# Patient Record
Sex: MALE | Race: WHITE | Employment: OTHER | URBAN - NONMETROPOLITAN AREA
[De-identification: names, ages, dates, MRNs, and addresses within clinical notes are randomized per-mention and may not be internally consistent; named-entity substitution may affect disease eponyms.]

---

## 2017-01-10 RX ORDER — ALPRAZOLAM 0.25 MG/1
0.25 TABLET ORAL 3 TIMES DAILY PRN
Qty: 90 TABLET | Refills: 0 | Status: SHIPPED | OUTPATIENT
Start: 2017-01-10 | End: 2017-02-10 | Stop reason: SDUPTHER

## 2017-01-10 RX ORDER — HYDROCODONE BITARTRATE AND ACETAMINOPHEN 10; 325 MG/1; MG/1
1 TABLET ORAL EVERY 6 HOURS PRN
Qty: 105 TABLET | Refills: 0 | Status: SHIPPED | OUTPATIENT
Start: 2017-01-10 | End: 2017-02-10 | Stop reason: SDUPTHER

## 2017-01-31 ENCOUNTER — OFFICE VISIT (OUTPATIENT)
Dept: PRIMARY CARE CLINIC | Age: 62
End: 2017-01-31
Payer: COMMERCIAL

## 2017-01-31 VITALS
RESPIRATION RATE: 20 BRPM | TEMPERATURE: 97.7 F | WEIGHT: 290 LBS | BODY MASS INDEX: 39.28 KG/M2 | HEART RATE: 88 BPM | DIASTOLIC BLOOD PRESSURE: 80 MMHG | SYSTOLIC BLOOD PRESSURE: 136 MMHG | HEIGHT: 72 IN

## 2017-01-31 DIAGNOSIS — Z51.81 ENCOUNTER FOR MONITORING TESTOSTERONE REPLACEMENT THERAPY: Primary | ICD-10-CM

## 2017-01-31 DIAGNOSIS — M25.561 CHRONIC PAIN OF RIGHT KNEE: ICD-10-CM

## 2017-01-31 DIAGNOSIS — Z23 NEED FOR ZOSTER VACCINATION: ICD-10-CM

## 2017-01-31 DIAGNOSIS — Z79.890 ENCOUNTER FOR MONITORING TESTOSTERONE REPLACEMENT THERAPY: Primary | ICD-10-CM

## 2017-01-31 DIAGNOSIS — M25.552 LEFT HIP PAIN: ICD-10-CM

## 2017-01-31 DIAGNOSIS — G89.29 CHRONIC PAIN OF RIGHT KNEE: ICD-10-CM

## 2017-01-31 DIAGNOSIS — E34.9 TESTOSTERONE DEFICIENCY: ICD-10-CM

## 2017-01-31 LAB
AMPHETAMINE SCREEN, URINE: ABNORMAL
BARBITURATE SCREEN, URINE: ABNORMAL
BENZODIAZEPINE SCREEN, URINE: ABNORMAL
COCAINE METABOLITE SCREEN URINE: ABNORMAL
MDMA URINE: ABNORMAL
METHADONE SCREEN, URINE: ABNORMAL
METHAMPHETAMINE, URINE: ABNORMAL
OPIATE SCREEN URINE: ABNORMAL
OXYCODONE SCREEN URINE: ABNORMAL
PHENCYCLIDINE SCREEN URINE: ABNORMAL
PROPOXYPHENE SCREEN, URINE: ABNORMAL
THC: ABNORMAL
TRICYCLIC ANTIDEPRESSANTS, UR: ABNORMAL

## 2017-01-31 PROCEDURE — 99214 OFFICE O/P EST MOD 30 MIN: CPT | Performed by: FAMILY MEDICINE

## 2017-01-31 PROCEDURE — 80305 DRUG TEST PRSMV DIR OPT OBS: CPT | Performed by: FAMILY MEDICINE

## 2017-01-31 RX ORDER — TESTOSTERONE 30 MG/1.5ML
SOLUTION TOPICAL
Qty: 90 G | Refills: 0 | Status: SHIPPED | OUTPATIENT
Start: 2017-01-31

## 2017-01-31 RX ORDER — UBIDECARENONE 75 MG
5000 CAPSULE ORAL DAILY
COMMUNITY

## 2017-01-31 ASSESSMENT — ENCOUNTER SYMPTOMS
VOMITING: 0
ABDOMINAL PAIN: 0
COLOR CHANGE: 0
NAUSEA: 0
TROUBLE SWALLOWING: 0
WHEEZING: 0
DIARRHEA: 0
COUGH: 0
SHORTNESS OF BREATH: 0
SORE THROAT: 0
RHINORRHEA: 0
EYE PAIN: 0
CHEST TIGHTNESS: 0
PHOTOPHOBIA: 0
BACK PAIN: 1
CONSTIPATION: 0

## 2017-02-03 ENCOUNTER — TELEPHONE (OUTPATIENT)
Dept: PRIMARY CARE CLINIC | Age: 62
End: 2017-02-03

## 2017-02-13 ENCOUNTER — TELEPHONE (OUTPATIENT)
Dept: PRIMARY CARE CLINIC | Age: 62
End: 2017-02-13

## 2017-02-13 RX ORDER — ALPRAZOLAM 0.25 MG/1
0.25 TABLET ORAL 3 TIMES DAILY PRN
Qty: 90 TABLET | Refills: 0 | Status: SHIPPED | OUTPATIENT
Start: 2017-02-13 | End: 2017-03-14 | Stop reason: SDUPTHER

## 2017-02-13 RX ORDER — HYDROCODONE BITARTRATE AND ACETAMINOPHEN 10; 325 MG/1; MG/1
1 TABLET ORAL EVERY 6 HOURS PRN
Qty: 90 TABLET | Refills: 0 | Status: SHIPPED | OUTPATIENT
Start: 2017-02-13 | End: 2017-03-14 | Stop reason: SDUPTHER

## 2017-03-10 ENCOUNTER — TELEPHONE (OUTPATIENT)
Dept: PRIMARY CARE CLINIC | Age: 62
End: 2017-03-10

## 2017-03-14 ENCOUNTER — HOSPITAL ENCOUNTER (OUTPATIENT)
Age: 62
Setting detail: SPECIMEN
Discharge: HOME | End: 2017-03-14
Payer: COMMERCIAL

## 2017-03-14 ENCOUNTER — OFFICE VISIT (OUTPATIENT)
Dept: PRIMARY CARE CLINIC | Age: 62
End: 2017-03-14
Payer: COMMERCIAL

## 2017-03-14 VITALS
BODY MASS INDEX: 36.98 KG/M2 | HEIGHT: 72 IN | WEIGHT: 273 LBS | DIASTOLIC BLOOD PRESSURE: 80 MMHG | HEART RATE: 86 BPM | SYSTOLIC BLOOD PRESSURE: 128 MMHG | RESPIRATION RATE: 16 BRPM | OXYGEN SATURATION: 99 %

## 2017-03-14 DIAGNOSIS — Z51.81 ENCOUNTER FOR MONITORING TESTOSTERONE REPLACEMENT THERAPY: Primary | ICD-10-CM

## 2017-03-14 DIAGNOSIS — L98.9 CHANGING SKIN LESION: ICD-10-CM

## 2017-03-14 DIAGNOSIS — Z79.890 ENCOUNTER FOR MONITORING TESTOSTERONE REPLACEMENT THERAPY: Primary | ICD-10-CM

## 2017-03-14 DIAGNOSIS — Z11.59 NEED FOR HEPATITIS C SCREENING TEST: ICD-10-CM

## 2017-03-14 PROCEDURE — 11100 PR BIOPSY OF SKIN LESION: CPT | Performed by: FAMILY MEDICINE

## 2017-03-14 PROCEDURE — 88305 TISSUE EXAM BY PATHOLOGIST: CPT

## 2017-03-14 PROCEDURE — 99213 OFFICE O/P EST LOW 20 MIN: CPT | Performed by: FAMILY MEDICINE

## 2017-03-14 RX ORDER — ALPRAZOLAM 0.25 MG/1
0.25 TABLET ORAL 3 TIMES DAILY PRN
Qty: 90 TABLET | Refills: 0 | Status: SHIPPED | OUTPATIENT
Start: 2017-03-14 | End: 2017-04-17 | Stop reason: SDUPTHER

## 2017-03-14 RX ORDER — HYDROCODONE BITARTRATE AND ACETAMINOPHEN 10; 325 MG/1; MG/1
1 TABLET ORAL EVERY 6 HOURS PRN
Qty: 90 TABLET | Refills: 0 | Status: SHIPPED | OUTPATIENT
Start: 2017-03-14 | End: 2017-04-17 | Stop reason: SDUPTHER

## 2017-03-14 ASSESSMENT — ENCOUNTER SYMPTOMS
SHORTNESS OF BREATH: 0
DIARRHEA: 0
WHEEZING: 0
COUGH: 0
NAUSEA: 0
VOMITING: 0
CONSTIPATION: 0
ABDOMINAL PAIN: 0
CHEST TIGHTNESS: 0

## 2017-03-17 ENCOUNTER — TELEPHONE (OUTPATIENT)
Dept: PRIMARY CARE CLINIC | Age: 62
End: 2017-03-17

## 2017-03-17 ENCOUNTER — HOSPITAL ENCOUNTER (OUTPATIENT)
Age: 62
Discharge: HOME | End: 2017-03-17
Payer: COMMERCIAL

## 2017-04-17 RX ORDER — ALPRAZOLAM 0.25 MG/1
0.25 TABLET ORAL 3 TIMES DAILY PRN
Qty: 90 TABLET | Refills: 0 | Status: SHIPPED | OUTPATIENT
Start: 2017-04-17 | End: 2017-05-22 | Stop reason: SDUPTHER

## 2017-04-17 RX ORDER — HYDROCODONE BITARTRATE AND ACETAMINOPHEN 10; 325 MG/1; MG/1
1 TABLET ORAL EVERY 6 HOURS PRN
Qty: 90 TABLET | Refills: 0 | Status: SHIPPED | OUTPATIENT
Start: 2017-04-17 | End: 2017-05-22 | Stop reason: SDUPTHER

## 2017-04-25 DIAGNOSIS — Z12.11 SCREENING FOR COLON CANCER: Primary | ICD-10-CM

## 2017-04-25 LAB
CONTROL: NORMAL
HEMOCCULT STL QL: NEGATIVE

## 2017-04-25 PROCEDURE — 82274 ASSAY TEST FOR BLOOD FECAL: CPT | Performed by: FAMILY MEDICINE

## 2017-05-22 RX ORDER — HYDROCODONE BITARTRATE AND ACETAMINOPHEN 10; 325 MG/1; MG/1
1 TABLET ORAL EVERY 6 HOURS PRN
Qty: 90 TABLET | Refills: 0 | Status: SHIPPED | OUTPATIENT
Start: 2017-05-22 | End: 2017-06-06 | Stop reason: SDUPTHER

## 2017-05-22 RX ORDER — ALPRAZOLAM 0.25 MG/1
0.25 TABLET ORAL 3 TIMES DAILY PRN
Qty: 90 TABLET | Refills: 0 | Status: SHIPPED | OUTPATIENT
Start: 2017-05-22 | End: 2017-06-06 | Stop reason: SDUPTHER

## 2017-06-06 ENCOUNTER — OFFICE VISIT (OUTPATIENT)
Dept: PRIMARY CARE CLINIC | Age: 62
End: 2017-06-06
Payer: COMMERCIAL

## 2017-06-06 VITALS
RESPIRATION RATE: 18 BRPM | BODY MASS INDEX: 37.36 KG/M2 | SYSTOLIC BLOOD PRESSURE: 128 MMHG | TEMPERATURE: 98.4 F | DIASTOLIC BLOOD PRESSURE: 80 MMHG | HEART RATE: 81 BPM | WEIGHT: 275.8 LBS | HEIGHT: 72 IN | OXYGEN SATURATION: 97 %

## 2017-06-06 DIAGNOSIS — M17.0 PRIMARY OSTEOARTHRITIS OF BOTH KNEES: ICD-10-CM

## 2017-06-06 DIAGNOSIS — E34.9 TESTOSTERONE DEFICIENCY: ICD-10-CM

## 2017-06-06 DIAGNOSIS — G56.03 BILATERAL CARPAL TUNNEL SYNDROME: ICD-10-CM

## 2017-06-06 DIAGNOSIS — Z12.11 COLON CANCER SCREENING: ICD-10-CM

## 2017-06-06 DIAGNOSIS — G89.29 CHRONIC PAIN OF RIGHT KNEE: ICD-10-CM

## 2017-06-06 DIAGNOSIS — M25.552 LEFT HIP PAIN: ICD-10-CM

## 2017-06-06 DIAGNOSIS — M25.561 CHRONIC PAIN OF RIGHT KNEE: ICD-10-CM

## 2017-06-06 DIAGNOSIS — R19.5 POSITIVE FECAL IMMUNOCHEMICAL TEST: Primary | ICD-10-CM

## 2017-06-06 LAB
AMPHETAMINE SCREEN, URINE: ABNORMAL
BARBITURATE SCREEN, URINE: ABNORMAL
BENZODIAZEPINE SCREEN, URINE: ABNORMAL
COCAINE METABOLITE SCREEN URINE: ABNORMAL
CONTROL: ABNORMAL
HEMOCCULT STL QL: POSITIVE
MDMA URINE: ABNORMAL
METHADONE SCREEN, URINE: ABNORMAL
METHAMPHETAMINE, URINE: ABNORMAL
OPIATE SCREEN URINE: ABNORMAL
OXYCODONE SCREEN URINE: ABNORMAL
PHENCYCLIDINE SCREEN URINE: ABNORMAL
PROPOXYPHENE SCREEN, URINE: ABNORMAL
THC: ABNORMAL
TRICYCLIC ANTIDEPRESSANTS, UR: ABNORMAL

## 2017-06-06 PROCEDURE — 99214 OFFICE O/P EST MOD 30 MIN: CPT | Performed by: FAMILY MEDICINE

## 2017-06-06 PROCEDURE — 80305 DRUG TEST PRSMV DIR OPT OBS: CPT | Performed by: FAMILY MEDICINE

## 2017-06-06 PROCEDURE — 82274 ASSAY TEST FOR BLOOD FECAL: CPT | Performed by: FAMILY MEDICINE

## 2017-06-06 RX ORDER — HYDROCODONE BITARTRATE AND ACETAMINOPHEN 10; 325 MG/1; MG/1
1 TABLET ORAL EVERY 6 HOURS PRN
Qty: 90 TABLET | Refills: 0 | Status: SHIPPED | OUTPATIENT
Start: 2017-07-20 | End: 2017-08-19

## 2017-06-06 RX ORDER — HYDROCODONE BITARTRATE AND ACETAMINOPHEN 10; 325 MG/1; MG/1
1 TABLET ORAL EVERY 6 HOURS PRN
Qty: 90 TABLET | Refills: 0 | Status: SHIPPED | OUTPATIENT
Start: 2017-08-19 | End: 2017-09-18

## 2017-06-06 RX ORDER — ALPRAZOLAM 0.25 MG/1
0.25 TABLET ORAL 3 TIMES DAILY PRN
Qty: 90 TABLET | Refills: 2 | Status: SHIPPED | OUTPATIENT
Start: 2017-06-21 | End: 2017-07-21

## 2017-06-06 RX ORDER — HYDROCODONE BITARTRATE AND ACETAMINOPHEN 10; 325 MG/1; MG/1
1 TABLET ORAL EVERY 6 HOURS PRN
Qty: 90 TABLET | Refills: 0 | Status: SHIPPED | OUTPATIENT
Start: 2017-06-21

## 2017-06-06 ASSESSMENT — ENCOUNTER SYMPTOMS
RHINORRHEA: 0
CHEST TIGHTNESS: 0
COLOR CHANGE: 0
WHEEZING: 0
COUGH: 0
CONSTIPATION: 0
SORE THROAT: 0
NAUSEA: 0
ABDOMINAL PAIN: 0
EYE PAIN: 0
DIARRHEA: 0
SHORTNESS OF BREATH: 0
VOMITING: 0
PHOTOPHOBIA: 0
TROUBLE SWALLOWING: 0

## 2017-06-15 ENCOUNTER — OFFICE VISIT (OUTPATIENT)
Dept: GASTROENTEROLOGY | Age: 62
End: 2017-06-15
Payer: COMMERCIAL

## 2017-06-15 VITALS
BODY MASS INDEX: 36.92 KG/M2 | OXYGEN SATURATION: 97 % | HEART RATE: 78 BPM | SYSTOLIC BLOOD PRESSURE: 124 MMHG | HEIGHT: 72 IN | WEIGHT: 272.6 LBS | DIASTOLIC BLOOD PRESSURE: 80 MMHG

## 2017-06-15 DIAGNOSIS — R12 CHRONIC HEARTBURN: ICD-10-CM

## 2017-06-15 DIAGNOSIS — K59.03 DRUG-INDUCED CONSTIPATION: ICD-10-CM

## 2017-06-15 DIAGNOSIS — R19.5 OB + STOOL: Primary | ICD-10-CM

## 2017-06-15 DIAGNOSIS — K62.5 BRBPR (BRIGHT RED BLOOD PER RECTUM): ICD-10-CM

## 2017-06-15 DIAGNOSIS — Z83.71 FAMILY HISTORY OF POLYPS IN THE COLON: ICD-10-CM

## 2017-06-15 PROBLEM — Z83.719 FAMILY HISTORY OF POLYPS IN THE COLON: Status: ACTIVE | Noted: 2017-06-15

## 2017-06-15 PROCEDURE — 99214 OFFICE O/P EST MOD 30 MIN: CPT | Performed by: NURSE PRACTITIONER

## 2017-06-15 ASSESSMENT — ENCOUNTER SYMPTOMS
BACK PAIN: 0
VOMITING: 0
SHORTNESS OF BREATH: 0
ALLERGIC/IMMUNOLOGIC NEGATIVE: 1
BLOOD IN STOOL: 0
ANAL BLEEDING: 1
ABDOMINAL PAIN: 0
WHEEZING: 0
SORE THROAT: 0
RECTAL PAIN: 0
COUGH: 0
NAUSEA: 0
DIARRHEA: 0
ABDOMINAL DISTENTION: 0
EYE DISCHARGE: 0
CONSTIPATION: 1
TROUBLE SWALLOWING: 0
RHINORRHEA: 0

## 2017-06-23 ENCOUNTER — ANESTHESIA (OUTPATIENT)
Dept: ENDOSCOPY | Age: 62
End: 2017-06-23
Payer: COMMERCIAL

## 2017-06-23 ENCOUNTER — ANESTHESIA EVENT (OUTPATIENT)
Dept: ENDOSCOPY | Age: 62
End: 2017-06-23
Payer: COMMERCIAL

## 2017-06-23 ENCOUNTER — HOSPITAL ENCOUNTER (OUTPATIENT)
Age: 62
Setting detail: OUTPATIENT SURGERY
Discharge: HOME | End: 2017-06-23
Attending: INTERNAL MEDICINE | Admitting: INTERNAL MEDICINE
Payer: COMMERCIAL

## 2017-06-23 VITALS
DIASTOLIC BLOOD PRESSURE: 71 MMHG | OXYGEN SATURATION: 93 % | SYSTOLIC BLOOD PRESSURE: 118 MMHG | RESPIRATION RATE: 24 BRPM

## 2017-06-23 VITALS
OXYGEN SATURATION: 96 % | RESPIRATION RATE: 18 BRPM | HEART RATE: 80 BPM | WEIGHT: 268 LBS | DIASTOLIC BLOOD PRESSURE: 83 MMHG | BODY MASS INDEX: 36.3 KG/M2 | SYSTOLIC BLOOD PRESSURE: 125 MMHG | HEIGHT: 72 IN | TEMPERATURE: 98 F

## 2017-06-23 PROCEDURE — 45385 COLONOSCOPY W/LESION REMOVAL: CPT | Performed by: INTERNAL MEDICINE

## 2017-06-23 PROCEDURE — 3609010300 HC COLONOSCOPY W/BIOPSY SINGLE/MULTIPLE: Performed by: INTERNAL MEDICINE

## 2017-06-23 PROCEDURE — 3700000000 HC ANESTHESIA ATTENDED CARE: Performed by: INTERNAL MEDICINE

## 2017-06-23 PROCEDURE — 7100000011 HC PHASE II RECOVERY - ADDTL 15 MIN: Performed by: INTERNAL MEDICINE

## 2017-06-23 PROCEDURE — 45380 COLONOSCOPY AND BIOPSY: CPT | Performed by: INTERNAL MEDICINE

## 2017-06-23 PROCEDURE — 2580000003 HC RX 258: Performed by: INTERNAL MEDICINE

## 2017-06-23 PROCEDURE — C1773 RET DEV, INSERTABLE: HCPCS | Performed by: INTERNAL MEDICINE

## 2017-06-23 PROCEDURE — 43239 EGD BIOPSY SINGLE/MULTIPLE: CPT | Performed by: INTERNAL MEDICINE

## 2017-06-23 PROCEDURE — 3700000001 HC ADD 15 MINUTES (ANESTHESIA): Performed by: INTERNAL MEDICINE

## 2017-06-23 PROCEDURE — 7100000010 HC PHASE II RECOVERY - FIRST 15 MIN: Performed by: INTERNAL MEDICINE

## 2017-06-23 PROCEDURE — 88305 TISSUE EXAM BY PATHOLOGIST: CPT

## 2017-06-23 PROCEDURE — 2500000003 HC RX 250 WO HCPCS: Performed by: INTERNAL MEDICINE

## 2017-06-23 PROCEDURE — 2580000003 HC RX 258: Performed by: NURSE ANESTHETIST, CERTIFIED REGISTERED

## 2017-06-23 PROCEDURE — 6360000002 HC RX W HCPCS: Performed by: NURSE ANESTHETIST, CERTIFIED REGISTERED

## 2017-06-23 PROCEDURE — 3609012400 HC EGD TRANSORAL BIOPSY SINGLE/MULTIPLE: Performed by: INTERNAL MEDICINE

## 2017-06-23 PROCEDURE — 2500000003 HC RX 250 WO HCPCS: Performed by: NURSE ANESTHETIST, CERTIFIED REGISTERED

## 2017-06-23 RX ORDER — LIDOCAINE HYDROCHLORIDE 20 MG/ML
INJECTION, SOLUTION INFILTRATION; PERINEURAL PRN
Status: DISCONTINUED | OUTPATIENT
Start: 2017-06-23 | End: 2017-06-23 | Stop reason: SDUPTHER

## 2017-06-23 RX ORDER — SODIUM CHLORIDE, SODIUM LACTATE, POTASSIUM CHLORIDE, CALCIUM CHLORIDE 600; 310; 30; 20 MG/100ML; MG/100ML; MG/100ML; MG/100ML
INJECTION, SOLUTION INTRAVENOUS CONTINUOUS
Status: DISCONTINUED | OUTPATIENT
Start: 2017-06-23 | End: 2017-06-23 | Stop reason: HOSPADM

## 2017-06-23 RX ORDER — FENTANYL CITRATE 50 UG/ML
INJECTION, SOLUTION INTRAMUSCULAR; INTRAVENOUS PRN
Status: DISCONTINUED | OUTPATIENT
Start: 2017-06-23 | End: 2017-06-23 | Stop reason: SDUPTHER

## 2017-06-23 RX ORDER — ONDANSETRON 2 MG/ML
4 INJECTION INTRAMUSCULAR; INTRAVENOUS
Status: DISCONTINUED | OUTPATIENT
Start: 2017-06-23 | End: 2017-06-23 | Stop reason: HOSPADM

## 2017-06-23 RX ORDER — LIDOCAINE HYDROCHLORIDE 10 MG/ML
1 INJECTION, SOLUTION EPIDURAL; INFILTRATION; INTRACAUDAL; PERINEURAL ONCE
Status: COMPLETED | OUTPATIENT
Start: 2017-06-23 | End: 2017-06-23

## 2017-06-23 RX ORDER — MIDAZOLAM HYDROCHLORIDE 1 MG/ML
INJECTION INTRAMUSCULAR; INTRAVENOUS PRN
Status: DISCONTINUED | OUTPATIENT
Start: 2017-06-23 | End: 2017-06-23 | Stop reason: SDUPTHER

## 2017-06-23 RX ORDER — PROMETHAZINE HYDROCHLORIDE 25 MG/ML
6.25 INJECTION, SOLUTION INTRAMUSCULAR; INTRAVENOUS
Status: DISCONTINUED | OUTPATIENT
Start: 2017-06-23 | End: 2017-06-23 | Stop reason: HOSPADM

## 2017-06-23 RX ORDER — DIPHENHYDRAMINE HYDROCHLORIDE 50 MG/ML
12.5 INJECTION INTRAMUSCULAR; INTRAVENOUS
Status: DISCONTINUED | OUTPATIENT
Start: 2017-06-23 | End: 2017-06-23 | Stop reason: HOSPADM

## 2017-06-23 RX ORDER — SODIUM CHLORIDE, SODIUM LACTATE, POTASSIUM CHLORIDE, CALCIUM CHLORIDE 600; 310; 30; 20 MG/100ML; MG/100ML; MG/100ML; MG/100ML
INJECTION, SOLUTION INTRAVENOUS CONTINUOUS PRN
Status: DISCONTINUED | OUTPATIENT
Start: 2017-06-23 | End: 2017-06-23 | Stop reason: SDUPTHER

## 2017-06-23 RX ORDER — PROPOFOL 10 MG/ML
INJECTION, EMULSION INTRAVENOUS PRN
Status: DISCONTINUED | OUTPATIENT
Start: 2017-06-23 | End: 2017-06-23 | Stop reason: SDUPTHER

## 2017-06-23 RX ADMIN — LIDOCAINE HYDROCHLORIDE 1 ML: 10 INJECTION, SOLUTION EPIDURAL; INFILTRATION; INTRACAUDAL; PERINEURAL at 10:18

## 2017-06-23 RX ADMIN — FENTANYL CITRATE 50 MCG: 50 INJECTION INTRAMUSCULAR; INTRAVENOUS at 10:39

## 2017-06-23 RX ADMIN — FENTANYL CITRATE 50 MCG: 50 INJECTION INTRAMUSCULAR; INTRAVENOUS at 10:45

## 2017-06-23 RX ADMIN — SODIUM CHLORIDE, POTASSIUM CHLORIDE, SODIUM LACTATE AND CALCIUM CHLORIDE: 600; 310; 30; 20 INJECTION, SOLUTION INTRAVENOUS at 10:17

## 2017-06-23 RX ADMIN — PROPOFOL 700 MG: 10 INJECTION, EMULSION INTRAVENOUS at 10:39

## 2017-06-23 RX ADMIN — MIDAZOLAM HYDROCHLORIDE 2 MG: 1 INJECTION, SOLUTION INTRAMUSCULAR; INTRAVENOUS at 10:39

## 2017-06-23 RX ADMIN — SODIUM CHLORIDE, SODIUM LACTATE, POTASSIUM CHLORIDE, AND CALCIUM CHLORIDE: 600; 310; 30; 20 INJECTION, SOLUTION INTRAVENOUS at 10:34

## 2017-06-23 RX ADMIN — LIDOCAINE HYDROCHLORIDE 40 MG: 20 INJECTION, SOLUTION INFILTRATION; PERINEURAL at 10:39

## 2017-06-23 ASSESSMENT — PAIN - FUNCTIONAL ASSESSMENT: PAIN_FUNCTIONAL_ASSESSMENT: 0-10

## 2022-03-25 ENCOUNTER — OFFICE VISIT (OUTPATIENT)
Age: 67
End: 2022-03-25
Payer: MEDICARE

## 2022-03-25 ENCOUNTER — HOSPITAL ENCOUNTER (EMERGENCY)
Age: 67
Discharge: HOME | End: 2022-03-25
Payer: MEDICARE

## 2022-03-25 ENCOUNTER — APPOINTMENT (OUTPATIENT)
Dept: CT IMAGING | Age: 67
End: 2022-03-25
Payer: MEDICARE

## 2022-03-25 VITALS
RESPIRATION RATE: 17 BRPM | SYSTOLIC BLOOD PRESSURE: 131 MMHG | BODY MASS INDEX: 31.83 KG/M2 | DIASTOLIC BLOOD PRESSURE: 84 MMHG | TEMPERATURE: 97.8 F | HEIGHT: 72 IN | OXYGEN SATURATION: 96 % | HEART RATE: 92 BPM | WEIGHT: 235 LBS

## 2022-03-25 VITALS
SYSTOLIC BLOOD PRESSURE: 112 MMHG | OXYGEN SATURATION: 96 % | WEIGHT: 235.8 LBS | BODY MASS INDEX: 31.94 KG/M2 | HEIGHT: 72 IN | RESPIRATION RATE: 18 BRPM | DIASTOLIC BLOOD PRESSURE: 62 MMHG | TEMPERATURE: 98 F | HEART RATE: 98 BPM

## 2022-03-25 DIAGNOSIS — R63.4 WEIGHT LOSS, NON-INTENTIONAL: Primary | ICD-10-CM

## 2022-03-25 DIAGNOSIS — K57.32 DIVERTICULITIS OF COLON: ICD-10-CM

## 2022-03-25 DIAGNOSIS — M54.50 ACUTE MIDLINE LOW BACK PAIN WITHOUT SCIATICA: Primary | ICD-10-CM

## 2022-03-25 DIAGNOSIS — R19.7 DIARRHEA, UNSPECIFIED TYPE: ICD-10-CM

## 2022-03-25 DIAGNOSIS — R93.5 ABNORMAL CT OF THE ABDOMEN: ICD-10-CM

## 2022-03-25 LAB
ALBUMIN SERPL-MCNC: 4.1 G/DL (ref 3.5–5.2)
ALP BLD-CCNC: 66 U/L (ref 40–130)
ALT SERPL-CCNC: 13 U/L (ref 5–41)
ANION GAP SERPL CALCULATED.3IONS-SCNC: 13 MMOL/L (ref 7–19)
APPEARANCE FLUID: ABNORMAL
AST SERPL-CCNC: 12 U/L (ref 5–40)
BASOPHILS ABSOLUTE: 0.1 K/UL (ref 0–0.2)
BASOPHILS RELATIVE PERCENT: 0.6 % (ref 0–1)
BILIRUB SERPL-MCNC: 0.4 MG/DL (ref 0.2–1.2)
BILIRUBIN, POC: ABNORMAL
BLOOD URINE, POC: ABNORMAL
BUN BLDV-MCNC: 14 MG/DL (ref 8–23)
CALCIUM SERPL-MCNC: 9.6 MG/DL (ref 8.8–10.2)
CHLORIDE BLD-SCNC: 98 MMOL/L (ref 98–111)
CLARITY, POC: CLEAR
CO2: 26 MMOL/L (ref 22–29)
COLOR, POC: ABNORMAL
CREAT SERPL-MCNC: 0.9 MG/DL (ref 0.5–1.2)
EOSINOPHILS ABSOLUTE: 0.3 K/UL (ref 0–0.6)
EOSINOPHILS RELATIVE PERCENT: 2.5 % (ref 0–5)
GFR AFRICAN AMERICAN: >59
GFR NON-AFRICAN AMERICAN: >60
GLUCOSE BLD-MCNC: 106 MG/DL (ref 74–109)
GLUCOSE URINE, POC: ABNORMAL
HCT VFR BLD CALC: 51 % (ref 42–52)
HEMOCCULT STL QL: ABNORMAL
HEMOCCULT STL QL: POSITIVE
HEMOCCULT STL QL: POSITIVE
HEMOGLOBIN: 16.7 G/DL (ref 14–18)
IMMATURE GRANULOCYTES #: 0 K/UL
KETONES, POC: ABNORMAL
LEUKOCYTE EST, POC: ABNORMAL
LIPASE: 42 U/L (ref 13–60)
LYMPHOCYTES ABSOLUTE: 1.9 K/UL (ref 1.1–4.5)
LYMPHOCYTES RELATIVE PERCENT: 17.2 % (ref 20–40)
MCH RBC QN AUTO: 31.3 PG (ref 27–31)
MCHC RBC AUTO-ENTMCNC: 32.7 G/DL (ref 33–37)
MCV RBC AUTO: 95.5 FL (ref 80–94)
MONOCYTES ABSOLUTE: 1 K/UL (ref 0–0.9)
MONOCYTES RELATIVE PERCENT: 9.1 % (ref 0–10)
NEUTROPHILS ABSOLUTE: 7.8 K/UL (ref 1.5–7.5)
NEUTROPHILS RELATIVE PERCENT: 70.2 % (ref 50–65)
NITRITE, POC: ABNORMAL
PDW BLD-RTO: 13 % (ref 11.5–14.5)
PH, POC: 5.5
PLATELET # BLD: 230 K/UL (ref 130–400)
PMV BLD AUTO: 10.9 FL (ref 9.4–12.4)
POTASSIUM REFLEX MAGNESIUM: 4.1 MMOL/L (ref 3.5–5)
PROTEIN, POC: 100
RBC # BLD: 5.34 M/UL (ref 4.7–6.1)
SODIUM BLD-SCNC: 137 MMOL/L (ref 136–145)
SPECIFIC GRAVITY, POC: >=1.03
TOTAL PROTEIN: 6.5 G/DL (ref 6.6–8.7)
UROBILINOGEN, POC: 2
WBC # BLD: 11.1 K/UL (ref 4.8–10.8)

## 2022-03-25 PROCEDURE — 80053 COMPREHEN METABOLIC PANEL: CPT

## 2022-03-25 PROCEDURE — 85025 COMPLETE CBC W/AUTO DIFF WBC: CPT

## 2022-03-25 PROCEDURE — 99283 EMERGENCY DEPT VISIT LOW MDM: CPT

## 2022-03-25 PROCEDURE — 81002 URINALYSIS NONAUTO W/O SCOPE: CPT

## 2022-03-25 PROCEDURE — 6360000002 HC RX W HCPCS: Performed by: PHYSICIAN ASSISTANT

## 2022-03-25 PROCEDURE — 96374 THER/PROPH/DIAG INJ IV PUSH: CPT

## 2022-03-25 PROCEDURE — 3017F COLORECTAL CA SCREEN DOC REV: CPT

## 2022-03-25 PROCEDURE — 4004F PT TOBACCO SCREEN RCVD TLK: CPT

## 2022-03-25 PROCEDURE — 82270 OCCULT BLOOD FECES: CPT

## 2022-03-25 PROCEDURE — 99203 OFFICE O/P NEW LOW 30 MIN: CPT

## 2022-03-25 PROCEDURE — 83690 ASSAY OF LIPASE: CPT

## 2022-03-25 PROCEDURE — 2580000003 HC RX 258: Performed by: PHYSICIAN ASSISTANT

## 2022-03-25 PROCEDURE — 74177 CT ABD & PELVIS W/CONTRAST: CPT

## 2022-03-25 PROCEDURE — 1123F ACP DISCUSS/DSCN MKR DOCD: CPT

## 2022-03-25 PROCEDURE — G8427 DOCREV CUR MEDS BY ELIG CLIN: HCPCS

## 2022-03-25 PROCEDURE — G8484 FLU IMMUNIZE NO ADMIN: HCPCS

## 2022-03-25 PROCEDURE — 4040F PNEUMOC VAC/ADMIN/RCVD: CPT

## 2022-03-25 PROCEDURE — 6360000004 HC RX CONTRAST MEDICATION: Performed by: PHYSICIAN ASSISTANT

## 2022-03-25 PROCEDURE — 36415 COLL VENOUS BLD VENIPUNCTURE: CPT

## 2022-03-25 PROCEDURE — G8417 CALC BMI ABV UP PARAM F/U: HCPCS

## 2022-03-25 RX ORDER — AMOXICILLIN AND CLAVULANATE POTASSIUM 875; 125 MG/1; MG/1
1 TABLET, FILM COATED ORAL 2 TIMES DAILY
Qty: 20 TABLET | Refills: 0 | Status: SHIPPED | OUTPATIENT
Start: 2022-03-25 | End: 2022-04-04

## 2022-03-25 RX ORDER — METOCLOPRAMIDE 10 MG/1
10 TABLET ORAL 4 TIMES DAILY
Qty: 120 TABLET | Refills: 3 | Status: SHIPPED | OUTPATIENT
Start: 2022-03-25

## 2022-03-25 RX ORDER — 0.9 % SODIUM CHLORIDE 0.9 %
500 INTRAVENOUS SOLUTION INTRAVENOUS ONCE
Status: COMPLETED | OUTPATIENT
Start: 2022-03-25 | End: 2022-03-25

## 2022-03-25 RX ORDER — METOCLOPRAMIDE HYDROCHLORIDE 5 MG/ML
10 INJECTION INTRAMUSCULAR; INTRAVENOUS ONCE
Status: COMPLETED | OUTPATIENT
Start: 2022-03-25 | End: 2022-03-25

## 2022-03-25 RX ADMIN — IOPAMIDOL 90 ML: 755 INJECTION, SOLUTION INTRAVENOUS at 18:49

## 2022-03-25 RX ADMIN — SODIUM CHLORIDE 500 ML: 9 INJECTION, SOLUTION INTRAVENOUS at 18:01

## 2022-03-25 RX ADMIN — METOCLOPRAMIDE 10 MG: 5 INJECTION, SOLUTION INTRAMUSCULAR; INTRAVENOUS at 18:05

## 2022-03-25 ASSESSMENT — ENCOUNTER SYMPTOMS
DIARRHEA: 1
ABDOMINAL PAIN: 1
COLOR CHANGE: 0
BLOOD IN STOOL: 0
ABDOMINAL DISTENTION: 1
EYE DISCHARGE: 0
ABDOMINAL PAIN: 1
BLOOD IN STOOL: 1
EYE ITCHING: 0
BACK PAIN: 0
SHORTNESS OF BREATH: 0
COUGH: 0
PHOTOPHOBIA: 0
APNEA: 0

## 2022-03-25 ASSESSMENT — PAIN SCALES - GENERAL: PAINLEVEL_OUTOF10: 7

## 2022-03-25 NOTE — PROGRESS NOTES
Asthma Brother     Colon Polyps Brother     Colon Cancer Neg Hx     Liver Cancer Neg Hx     Liver Disease Neg Hx     Esophageal Cancer Neg Hx     Rectal Cancer Neg Hx     Stomach Cancer Neg Hx        Social History     Tobacco Use    Smoking status: Current Every Day Smoker     Packs/day: 0.50     Years: 30.00     Pack years: 15.00    Smokeless tobacco: Not on file   Substance Use Topics    Alcohol use: Yes     Comment: occasionally      Current Outpatient Medications   Medication Sig Dispense Refill    omeprazole (PRILOSEC) 20 MG capsule Take 20 mg by mouth daily       HYDROcodone-acetaminophen (NORCO)  MG per tablet Take 1 tablet by mouth every 6 hours as needed for Pain . Earliest Fill Date: 6/21/17 (Patient not taking: Reported on 3/25/2022) 90 tablet 0    aspirin 81 MG tablet Take 81 mg by mouth daily (Patient not taking: Reported on 3/25/2022)      vitamin D (CHOLECALCIFEROL) 1000 UNIT TABS tablet Take 1,000 Units by mouth daily (Patient not taking: Reported on 3/25/2022)      vitamin B-12 (CYANOCOBALAMIN) 100 MCG tablet Take 5,000 mcg by mouth daily (Patient not taking: Reported on 3/25/2022)      Testosterone 30 MG/ACT SOLN Apply 1 pump to each armpit daily. (Patient not taking: Reported on 3/25/2022) 90 g 0    montelukast (SINGULAIR) 10 MG tablet Take 1 tablet by mouth nightly (Patient not taking: Reported on 3/25/2022) 30 tablet 5    Multiple Vitamins-Minerals (THERAPEUTIC MULTIVITAMIN-MINERALS) tablet Take 1 tablet by mouth daily (Patient not taking: Reported on 3/25/2022)      fluticasone (FLONASE) 50 MCG/ACT nasal spray 2 sprays by Nasal route daily (Patient not taking: Reported on 3/25/2022) 1 Bottle 5     No current facility-administered medications for this visit.      Allergies   Allergen Reactions    Naproxen      GI Bleed    Sulfa Antibiotics     Celebrex [Celecoxib] Rash       Health Maintenance   Topic Date Due    Hepatitis C screen  Never done    COVID-19 Vaccine (1) Never done    Depression Screen  Never done    Shingles Vaccine (1 of 2) Never done    A1C test (Diabetic or Prediabetic)  04/15/2014    Pneumococcal 65+ years Vaccine (1 of 1 - PPSV23) Never done    AAA screen  Never done    Flu vaccine (1) 09/01/2021    Lipid screen  01/31/2022    DTaP/Tdap/Td vaccine (2 - Td or Tdap) 03/14/2022    Colorectal Cancer Screen  06/23/2027    Hepatitis A vaccine  Aged Out    Hepatitis B vaccine  Aged Out    Hib vaccine  Aged Out    Meningococcal (ACWY) vaccine  Aged Out       Subjective:     Review of Systems   Constitutional: Negative for fever. Gastrointestinal: Positive for abdominal distention, abdominal pain and diarrhea. Negative for blood in stool. Genitourinary: Negative for dysuria and urgency.       :Objective      Physical Exam  Constitutional:       General: He is not in acute distress. Appearance: Normal appearance. He is not toxic-appearing. HENT:      Head: Normocephalic and atraumatic. Right Ear: Tympanic membrane, ear canal and external ear normal.      Left Ear: Tympanic membrane, ear canal and external ear normal.      Nose: Nose normal.      Mouth/Throat:      Mouth: Mucous membranes are moist.   Eyes:      General:         Right eye: No discharge. Left eye: No discharge. Conjunctiva/sclera: Conjunctivae normal.   Cardiovascular:      Rate and Rhythm: Normal rate and regular rhythm. Pulmonary:      Effort: Pulmonary effort is normal. No respiratory distress. Abdominal:      General: Abdomen is flat. Bowel sounds are normal. There is distension. Palpations: Abdomen is soft. Tenderness: There is abdominal tenderness (reports pressure - grimaces) in the right upper quadrant, epigastric area and left upper quadrant. There is no right CVA tenderness or left CVA tenderness. Hernia: A hernia is present. Comments: C/O low abdominal pressure over bladder.   Rectal exam performed with chaperone present [facility], LPN). + OB stool. Musculoskeletal:         General: Normal range of motion. Cervical back: Normal range of motion. Lymphadenopathy:      Cervical: No cervical adenopathy. Skin:     General: Skin is warm and dry. Capillary Refill: Capillary refill takes less than 2 seconds. Findings: No rash. Neurological:      General: No focal deficit present. Mental Status: He is alert. Psychiatric:         Mood and Affect: Mood normal.       /62   Pulse 98   Temp 98 °F (36.7 °C) (Temporal)   Resp 18   Ht 6' (1.829 m)   Wt 235 lb 12.8 oz (107 kg)   SpO2 96%   BMI 31.98 kg/m²     :Assessment       Diagnosis Orders   1. Acute midline low back pain without sciatica  POCT Urinalysis no Micro   2. Diarrhea, unspecified type  POCT occult blood stool       :Plan    Pt initially refused to go to ER for evaluation. Had planned to attempt to get pt GI appt early next week, but it has been > 3 years and he would have to reestablish with GI and PCP before being evaluated. Abnormal POC urine and OB stool here in office. D/W patient that colon cancer is a potential differential diagnosis that cannot be excluded here in the urgent care. He is agreeable to go to [facility] ER. Report called to [facility].      Orders Placed This Encounter   Procedures    POCT Urinalysis no Micro    POCT occult blood stool     Results for orders placed or performed in visit on 03/25/22   POCT Urinalysis no Micro   Result Value Ref Range    Color, UA dark yellow     Clarity, UA clear     Glucose, UA POC 100mg (A)     Bilirubin, UA moderate (A)     Ketones, UA trace (A)     Spec Grav, UA >=1.030     Blood, UA POC trace-lysed (A)     pH, UA 5.5     Protein, UA  (A)     Urobilinogen, UA 2.0 (A)     Leukocytes, UA neg     Nitrite, UA neg     Appearance, Fluid     POCT occult blood stool   Result Value Ref Range    OCCULT BLOOD FECAL positive     OCCULT BLOOD FECAL positive     OCCULT BLOOD FECAL       Return if symptoms worsen or fail to improve. No orders of the defined types were placed in this encounter. Patient given educational materials- see patient instructions. Discussed use, benefit, and side effects of prescribed medications. All patient questions answered. Pt voiced understanding. There are no Patient Instructions on file for this visit.       Electronically signed by LISA Tolentino CNP on 3/25/2022 at 3:41 PM

## 2022-03-25 NOTE — ED PROVIDER NOTES
[facility]  eMERGENCYdEPARTMENT eNCOUnter      Pt Name: Magalie Gross  MRN: [id number]  [place] 1955  Date of evaluation: 3/25/2022  Provider:KASEY Saldaña    CHIEF COMPLAINT       Chief Complaint   Patient presents with    Diarrhea     low abdominal pain since last night, diarrhea x 2.5 wks         HISTORY OF PRESENT ILLNESS  (Location/Symptom, Timing/Onset, Context/Setting, Quality, Duration, Modifying Factors, Severity.)   Magalie Gross is a 77 y.o. male who presents to the emergency department with history of lower abdominal pain acute onset starting last night less than 24-hour onset however has been having diarrhea for 2-1/2 weeks. Patient report given to me from nurse stating prior abnormal colonoscopy concern for possible colorectal CA that was not followed on. He denies any recent antibiotics in regards to C. difficile concern. 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 He has a appropriate appetite good intake no  symptoms. Patient states that it is more about his bladder and bilateral lower quadrants where he is mildly tender he states that he has had loose stool now for 2-1/2 weeks its been dark he endorses taking Pepto-Bismol could be playing a role. The pain is the acute issue that made him go to urgent care today. He rates it as a 7 out of 10 aching in character no intervention. Has already had urinalysis and rectal exam done in urgent care that was heme positive he had some ketones and glucose in his urine as well as bilirubin. He denies prior history of cirrhosis that he is aware of.   He has seen Dr. Silvana Adam as a PCP in the past but does not endorsing regularly. HPI    Nursing Notes were reviewed and I agree. REVIEW OF SYSTEMS    (2-9 systems for level 4, 10 or more for level 5)     Review of Systems   Constitutional: Positive for unexpected weight change. Negative for activity change, appetite change, chills and fever. HENT: Negative for congestion and dental problem. Eyes: Negative for photophobia, discharge and itching. Respiratory: Negative for apnea, cough and shortness of breath. Cardiovascular: Negative for chest pain. Gastrointestinal: Positive for abdominal pain and blood in stool. Musculoskeletal: Negative for arthralgias, back pain, gait problem, myalgias and neck pain. Skin: Negative for color change, pallor and rash. Neurological: Negative for dizziness, seizures and syncope. Psychiatric/Behavioral: Negative for agitation. The patient is not nervous/anxious. Except as noted above the remainder of the review of systems was reviewed and negative.        PAST MEDICAL HISTORY     Past Medical History:   Diagnosis Date    Arthritis     Chronic pain disorder     HIPS AND KNEES    GERD (gastroesophageal reflux disease)     Hyperlipidemia     Hypogonadism in male     Joint pain     Tobacco abuse     Vitamin D deficiency          SURGICAL HISTORY       Past Surgical History:   Procedure Laterality Date    CARPAL TUNNEL RELEASE Left 2/3/2016    LT CTR performed by Jennifer Khan MD at [address] [facility] ARTHROSCOPY      L KNEE SCOPE AND RIGHT KNEE SCOPE    MD COLONOSCOPY W/BIOPSY SINGLE/MULTIPLE N/A 6/23/2017    Dr CHRISTIANO Freeman-piecemeal, diverticular disease-Tubular AP (-) dysplasia x 3, serrated HP x 1--6 month recall    MD EGD TRANSORAL BIOPSY SINGLE/MULTIPLE N/A 6/23/2017    Dr Bar Mcconnell hiatal hernia, Gastric intestinal metaplasia, (-) dysplasia    PROSTATE SURGERY      biopsy    SHOULDER SURGERY Right     TESTICLE SURGERY      strangulated testicle    TONSILLECTOMY AND ADENOIDECTOMY           CURRENT MEDICATIONS       Discharge Medication List as of 3/25/2022  8:14 PM      CONTINUE these medications which have NOT CHANGED    Details   HYDROcodone-acetaminophen (NORCO)  MG per tablet Take 1 tablet by mouth every 6 hours as needed for Pain . Earliest Fill Date: 6/21/17, Disp-90 tablet, R-0Print      aspirin 81 MG tablet Take 81 mg by mouth daily Historical Med      vitamin D (CHOLECALCIFEROL) 1000 UNIT TABS tablet Take 1,000 Units by mouth daily Historical Med      vitamin B-12 (CYANOCOBALAMIN) 100 MCG tablet Take 5,000 mcg by mouth daily Historical Med      Testosterone 30 MG/ACT SOLN Apply 1 pump to each armpit daily. , Disp-90 g, R-0      montelukast (SINGULAIR) 10 MG tablet Take 1 tablet by mouth nightly, Disp-30 tablet, R-5      Multiple Vitamins-Minerals (THERAPEUTIC MULTIVITAMIN-MINERALS) tablet Take 1 tablet by mouth daily Historical Med      omeprazole (PRILOSEC) 20 MG capsule Take 20 mg by mouth daily Historical Med      fluticasone (FLONASE) 50 MCG/ACT nasal spray 2 sprays by Nasal route daily, Disp-1 Bottle, R-5             ALLERGIES     Naproxen, Sulfa antibiotics, and Celebrex [celecoxib]    FAMILY HISTORY       Family History   Problem Relation Age of Onset    Cancer Mother         breast cancer    Heart Disease Mother         afib    Hypertension Mother     Heart Disease Father         cad & afib    Asthma Brother     Colon Polyps Brother     Colon Cancer Neg Hx     Liver Cancer Neg Hx     Liver Disease Neg Hx     Esophageal Cancer Neg Hx     Rectal Cancer Neg Hx     Stomach Cancer Neg Hx           SOCIAL HISTORY       Social History     Socioeconomic History    Marital status:      Spouse name: None    Number of children: None    Years of education: None    Highest education level: None   Occupational History    None   Tobacco Use    Smoking status: Current Every Day Smoker     Packs/day: 1.00     Years: 30.00     Pack years: 30.00    Smokeless tobacco: Never Used Vaping Use    Vaping Use: Never used   Substance and Sexual Activity    Alcohol use: Yes     Comment: occasionally    Drug use: No    Sexual activity: None   Other Topics Concern    None   Social History Narrative    None     Social Determinants of Health     Financial Resource Strain:     Difficulty of Paying Living Expenses: Not on file   Food Insecurity:     Worried About Running Out of Food in the Last Year: Not on file    Scottie of Food in the Last Year: Not on file   Transportation Needs:     Lack of Transportation (Medical): Not on file    Lack of Transportation (Non-Medical):  Not on file   Physical Activity:     Days of Exercise per Week: Not on file    Minutes of Exercise per Session: Not on file   Stress:     Feeling of Stress : Not on file   Social Connections:     Frequency of Communication with Friends and Family: Not on file    Frequency of Social Gatherings with Friends and Family: Not on file    Attends Roman [employer] Services: Not on file    Active Member of [address] [place] [address] or Organizations: Not on file    Attends Club or Organization Meetings: Not on file    Marital Status: Not on file   Intimate Partner Violence:     Fear of Current or Ex-Partner: Not on file    Emotionally Abused: Not on file    Physically Abused: Not on file    Sexually Abused: Not on file   Housing Stability:     Unable to Pay for Housing in the Last Year: Not on file    Number of [place] in the Last Year: Not on file    Unstable Housing in the Last Year: Not on file       SCREENINGS    Greyson Coma Scale  Eye Opening: Spontaneous  Best Verbal Response: Oriented  Best Motor Response: Obeys commands  Greyson Coma Scale Score: 15      PHYSICAL EXAM    (up to 7 forlevel 4, 8 or more for level 5)     ED Triage Vitals [03/25/22 1626]   BP Temp Temp Source Pulse Resp SpO2 Height Weight   131/84 97.8 °F (36.6 °C) Temporal 92 17 96 % 6' (1.829 m) 235 lb (106.6 kg)       Physical Exam  Vitals and nursing note reviewed. Constitutional:       General: He is not in acute distress. Appearance: He is well-developed. He is obese. He is not ill-appearing or diaphoretic. HENT:      Head: Normocephalic and atraumatic. Right Ear: Tympanic membrane, ear canal and external ear normal.      Left Ear: Tympanic membrane, ear canal and external ear normal.      Nose: Nose normal.      Mouth/Throat:      Mouth: Mucous membranes are moist.   Eyes:      Pupils: Pupils are equal, round, and reactive to light. Neck:      Trachea: No tracheal deviation. Cardiovascular:      Rate and Rhythm: Normal rate and regular rhythm. Pulses: Normal pulses. Heart sounds: Normal heart sounds. No murmur heard. Pulmonary:      Effort: Pulmonary effort is normal.      Breath sounds: Normal breath sounds. No stridor. No wheezing. Chest:      Chest wall: No tenderness. Abdominal:      General: Abdomen is flat. Bowel sounds are normal. There is no distension. Palpations: Abdomen is soft. Tenderness: There is no abdominal tenderness. Musculoskeletal:         General: Normal range of motion. Cervical back: Normal range of motion and neck supple. Skin:     General: Skin is warm and dry. Capillary Refill: Capillary refill takes less than 2 seconds. Neurological:      Mental Status: He is alert and oriented to person, place, and time. Psychiatric:         Mood and Affect: Mood normal.         Behavior: Behavior normal.         Thought Content:  Thought content normal.         Judgment: Judgment normal.           DIAGNOSTIC RESULTS     RADIOLOGY:   Non-plain film images such as CT, Ultrasound and MRI are read by the radiologist. Plain radiographic images are visualized and preliminarilyinterpreted by No att. providers found with the below findings:      Interpretation per the Radiologist below, if available at the time of this note:    CT ABDOMEN PELVIS W IV CONTRAST Additional Contrast? None   Final Result   1. Apparent acute uncomplicated diverticulitis along the lower sigmoid   colon. No visualized perforation or abscess formation. I would not   exclude underlying sigmoid colon malignancy in this area, as the wall   is quite abnormal, given that there is a history of prior abnormal   colonoscopy; and I would recommend follow-up per the earlier   colonoscopy recommendations. Signed by Dr Cheli Gaming:  Labs Reviewed   CBC WITH AUTO DIFFERENTIAL - Abnormal; Notable for the following components:       Result Value    WBC 11.1 (*)     MCV 95.5 (*)     MCH 31.3 (*)     MCHC 32.7 (*)     Neutrophils % 70.2 (*)     Lymphocytes % 17.2 (*)     Neutrophils Absolute 7.8 (*)     Monocytes Absolute 1.00 (*)     All other components within normal limits   COMPREHENSIVE METABOLIC PANEL W/ REFLEX TO MG FOR LOW K - Abnormal; Notable for the following components: Total Protein 6.5 (*)     All other components within normal limits   GASTROINTESTINAL PANEL, MOLECULAR   LIPASE       All other labs were within normal range or notreturned as of this dictation. RE-ASSESSMENT        EMERGENCY DEPARTMENT COURSE and DIFFERENTIAL DIAGNOSIS/MDM:   Vitals:    Vitals:    03/25/22 1626   BP: 131/84   Pulse: 92   Resp: 17   Temp: 97.8 °F (36.6 °C)   TempSrc: Temporal   SpO2: 96%   Weight: 235 lb (106.6 kg)   Height: 6' (1.829 m)         MDM  Patient is made aware of this read from radiology in regards to potential for malignant concern about the rectum. He has diverticulitis which he states he has never been diagnosed with before his pain is mild feel he is appropriate for discharge oral treatment he is made aware to follow closely with Dr. Burgess Davila for outpatient referral to GI colonoscopy he verbalizes that he will do so based on CT read today. No other acute findings on this gentleman's work-up lab values are all unremarkable nothing atypical at this time.   He is passing p.o. challenge here for documentation  PROCEDURES:    Procedures      FINAL IMPRESSION      1. Weight loss, non-intentional    2. Diverticulitis of colon    3.  Abnormal CT of the abdomen          DISPOSITION/PLAN   DISPOSITION Decision To Discharge 03/25/2022 07:53:22 PM      PATIENT REFERRED TO:  [facility]  Farooq Valeriabuster  [phone]    If symptoms worsen    Elaina Brock MD  [address] [place] [address]  [address] [place] [address] [phone]      GI referral      DISCHARGE MEDICATIONS:  Discharge Medication List as of 3/25/2022  8:14 PM      START taking these medications    Details   amoxicillin-clavulanate (AUGMENTIN) 875-125 MG per tablet Take 1 tablet by mouth 2 times daily for 10 days, Disp-20 tablet, R-0Normal      metoclopramide (REGLAN) 10 MG tablet Take 1 tablet by mouth 4 times daily, Disp-120 tablet, R-3Normal             (Please note that portions of this note were completed with a voice recognition program.  Efforts were made to edit the dictations but occasionallywords are mis-transcribed.)    Ramona Morrison [address] [place] [address]  03/25/22 [id number]

## 2024-05-18 ENCOUNTER — APPOINTMENT (OUTPATIENT)
Dept: CT IMAGING | Age: 69
End: 2024-05-18
Payer: MEDICARE

## 2024-05-18 ENCOUNTER — APPOINTMENT (OUTPATIENT)
Dept: GENERAL RADIOLOGY | Age: 69
End: 2024-05-18
Payer: MEDICARE

## 2024-05-18 ENCOUNTER — HOSPITAL ENCOUNTER (EMERGENCY)
Age: 69
Discharge: HOME | End: 2024-05-18
Attending: EMERGENCY MEDICINE
Payer: MEDICARE

## 2024-05-18 VITALS
HEART RATE: 75 BPM | DIASTOLIC BLOOD PRESSURE: 79 MMHG | BODY MASS INDEX: 38.65 KG/M2 | OXYGEN SATURATION: 95 % | RESPIRATION RATE: 19 BRPM | SYSTOLIC BLOOD PRESSURE: 120 MMHG | WEIGHT: 285 LBS | TEMPERATURE: 98.2 F

## 2024-05-18 DIAGNOSIS — R06.02 SHORTNESS OF BREATH: Primary | ICD-10-CM

## 2024-05-18 LAB
ALBUMIN SERPL-MCNC: 4.5 G/DL (ref 3.5–5.2)
ALP SERPL-CCNC: 73 U/L (ref 40–130)
ALT SERPL-CCNC: 35 U/L (ref 5–41)
ANION GAP SERPL CALCULATED.3IONS-SCNC: 15 MMOL/L (ref 7–19)
AST SERPL-CCNC: 25 U/L (ref 5–40)
BASOPHILS # BLD: 0 K/UL (ref 0–0.2)
BASOPHILS NFR BLD: 0.5 % (ref 0–1)
BILIRUB SERPL-MCNC: 0.5 MG/DL (ref 0.2–1.2)
BNP BLD-MCNC: 64 PG/ML (ref 0–124)
BUN SERPL-MCNC: 17 MG/DL (ref 8–23)
CALCIUM SERPL-MCNC: 9.9 MG/DL (ref 8.8–10.2)
CHLORIDE SERPL-SCNC: 100 MMOL/L (ref 98–111)
CO2 SERPL-SCNC: 24 MMOL/L (ref 22–29)
CREAT SERPL-MCNC: 1.5 MG/DL (ref 0.5–1.2)
D DIMER PPP FEU-MCNC: 1.09 UG/ML FEU (ref 0–0.48)
EOSINOPHIL # BLD: 0.2 K/UL (ref 0–0.6)
EOSINOPHIL NFR BLD: 2.7 % (ref 0–5)
ERYTHROCYTE [DISTWIDTH] IN BLOOD BY AUTOMATED COUNT: 13 % (ref 11.5–14.5)
GLUCOSE SERPL-MCNC: 163 MG/DL (ref 74–109)
HCT VFR BLD AUTO: 52.3 % (ref 42–52)
HGB BLD-MCNC: 17.8 G/DL (ref 14–18)
IMM GRANULOCYTES # BLD: 0 K/UL
LYMPHOCYTES # BLD: 1.8 K/UL (ref 1.1–4.5)
LYMPHOCYTES NFR BLD: 23.7 % (ref 20–40)
MCH RBC QN AUTO: 30.7 PG (ref 27–31)
MCHC RBC AUTO-ENTMCNC: 34 G/DL (ref 33–37)
MCV RBC AUTO: 90.3 FL (ref 80–94)
MONOCYTES # BLD: 0.7 K/UL (ref 0–0.9)
MONOCYTES NFR BLD: 8.6 % (ref 0–10)
NEUTROPHILS # BLD: 4.9 K/UL (ref 1.5–7.5)
NEUTS SEG NFR BLD: 64.1 % (ref 50–65)
PLATELET # BLD AUTO: 250 K/UL (ref 130–400)
PMV BLD AUTO: 10.3 FL (ref 9.4–12.4)
POTASSIUM SERPL-SCNC: 4.1 MMOL/L (ref 3.5–5)
PROT SERPL-MCNC: 8 G/DL (ref 6.6–8.7)
RBC # BLD AUTO: 5.79 M/UL (ref 4.7–6.1)
SODIUM SERPL-SCNC: 139 MMOL/L (ref 136–145)
TROPONIN, HIGH SENSITIVITY: 11 NG/L (ref 0–22)
WBC # BLD AUTO: 7.7 K/UL (ref 4.8–10.8)

## 2024-05-18 PROCEDURE — 84484 ASSAY OF TROPONIN QUANT: CPT

## 2024-05-18 PROCEDURE — 71045 X-RAY EXAM CHEST 1 VIEW: CPT

## 2024-05-18 PROCEDURE — 85379 FIBRIN DEGRADATION QUANT: CPT

## 2024-05-18 PROCEDURE — 71275 CT ANGIOGRAPHY CHEST: CPT

## 2024-05-18 PROCEDURE — 85025 COMPLETE CBC W/AUTO DIFF WBC: CPT

## 2024-05-18 PROCEDURE — 36415 COLL VENOUS BLD VENIPUNCTURE: CPT

## 2024-05-18 PROCEDURE — 99285 EMERGENCY DEPT VISIT HI MDM: CPT

## 2024-05-18 PROCEDURE — 6360000004 HC RX CONTRAST MEDICATION: Performed by: EMERGENCY MEDICINE

## 2024-05-18 PROCEDURE — 93005 ELECTROCARDIOGRAM TRACING: CPT | Performed by: EMERGENCY MEDICINE

## 2024-05-18 PROCEDURE — 83880 ASSAY OF NATRIURETIC PEPTIDE: CPT

## 2024-05-18 PROCEDURE — 80053 COMPREHEN METABOLIC PANEL: CPT

## 2024-05-18 RX ORDER — METHYLPREDNISOLONE 4 MG/1
TABLET ORAL
Qty: 1 KIT | Refills: 0 | Status: SHIPPED | OUTPATIENT
Start: 2024-05-18 | End: 2024-05-24

## 2024-05-18 RX ADMIN — IOPAMIDOL 75 ML: 755 INJECTION, SOLUTION INTRAVENOUS at 11:27

## 2024-05-18 ASSESSMENT — ENCOUNTER SYMPTOMS
SHORTNESS OF BREATH: 0
RHINORRHEA: 0
SORE THROAT: 0
ABDOMINAL PAIN: 0
SINUS PRESSURE: 0
VOMITING: 0
NAUSEA: 0

## 2024-05-18 NOTE — DISCHARGE INSTRUCTIONS
The nature of the shortness of breath is unclear may be related to COPD but you also would benefit from further evaluation including a stress test done as an outpatient.  Return immediately to the emergency room for any new or worsening symptoms.

## 2024-05-18 NOTE — ED PROVIDER NOTES
[facility] EMERGENCY DEPT  eMERGENCY dEPARTMENT eNCOUnter      Pt Name: Francisco Avila  MRN: [id number]  Birthdate 1955  Date of evaluation: 5/18/2024  Provider: Clifton Alaniz MD    CHIEF COMPLAINT       Chief Complaint   Patient presents with    Shortness of Breath    Chest Pain         HISTORY OF PRESENT ILLNESS   (Location/Symptom, Timing/Onset,Context/Setting, Quality, Duration, Modifying Factors, Severity)  Note limiting factors.   Francisco Avila is a 68 y.o. 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  Has some mild chest discomfort with diaphoresis.  No nausea or vomiting; no cough; fever; chills; difficulty breathing at rest.  Took his pulse ox earlier after walking and it reported present.  Heart rate showed tachycardia.  Denies palpitations.  Works as a nurse.  Vaccinated against COVID and influenza.  Works as a nurse.  Notes feeling short of breath when he ambulates to the mailbox which is down a hill.  Distant history of a stress test.  Currently does not complain of chest pain.  Former smoker but quit 10 months ago; denies bright red blood per rectum or melena.  Had some diarrhea yesterday.    NursingNotes were reviewed.    REVIEW OF SYSTEMS    (2-9 systems for level 4, 10 or more for level 5)     Review of Systems   Constitutional:  Negative for chills, diaphoresis, fatigue and fever.   HENT:  Negative for rhinorrhea, sinus pressure and sore throat.    Eyes:  Negative for visual disturbance.   Respiratory:  Negative for shortness of breath.    Cardiovascular:  Negative for chest pain.   Gastrointestinal:  Negative for  be asymptomatic would recommend an outpatient stress test and further outpatient evaluation and the patient voiced understanding.  He is a nurse and sophisticated and is well aware of risks and benefits of further treatment and evaluation at this time.  Will be discharged home with a Medrol Dosepak as his symptoms may be related to COPD.      CONSULTS:  None    PROCEDURES:  Unless otherwise noted below, none     Procedures    FINAL IMPRESSION      1. Shortness of breath          DISPOSITION/PLAN   DISPOSITION Decision To Discharge 05/18/2024 01:17:26 PM      PATIENT REFERRED TO:  Francisco Sloan MD  [address] [place], [place] [id number] [phone]            DISCHARGE MEDICATIONS:  Discharge Medication List as of 5/18/2024  1:23 PM        START taking these medications    Details   methylPREDNISolone (MEDROL, CORY,) 4 MG tablet Take by mouth., Disp-1 kit, R-0Normal                (Please note that portions of this note were completed with a voice recognition program.  Efforts were made to edit thedictations but occasionally words are mis-transcribed.)    Clifton Alaniz MD (electronically signed)  Attending Emergency Physician        Clifton Alaniz MD  05/19/24 [id number]

## 2024-05-19 LAB
EKG P AXIS: 66 DEGREES
EKG P-R INTERVAL: 164 MS
EKG Q-T INTERVAL: 332 MS
EKG QRS DURATION: 100 MS
EKG QTC CALCULATION (BAZETT): 409 MS
EKG T AXIS: 68 DEGREES

## 2024-05-19 PROCEDURE — 93010 ELECTROCARDIOGRAM REPORT: CPT | Performed by: INTERNAL MEDICINE

## 2024-05-22 ENCOUNTER — TRANSCRIBE ORDERS (OUTPATIENT)
Dept: ADMINISTRATIVE | Facility: HOSPITAL | Age: 69
End: 2024-05-22

## 2024-05-22 DIAGNOSIS — R06.09 OTHER FORMS OF DYSPNEA: Primary | ICD-10-CM

## 2024-05-22 DIAGNOSIS — R07.9 CHEST PAIN, UNSPECIFIED TYPE: ICD-10-CM

## 2024-06-18 ENCOUNTER — HOSPITAL ENCOUNTER (OUTPATIENT)
Age: 69
Discharge: HOME | End: 2024-06-20
Payer: MEDICARE

## 2024-06-18 ENCOUNTER — HOSPITAL ENCOUNTER (OUTPATIENT)
Dept: NUCLEAR MEDICINE | Age: 69
Discharge: HOME | End: 2024-06-20
Payer: MEDICARE

## 2024-06-18 VITALS
DIASTOLIC BLOOD PRESSURE: 75 MMHG | WEIGHT: 275 LBS | BODY MASS INDEX: 37.25 KG/M2 | HEIGHT: 72 IN | SYSTOLIC BLOOD PRESSURE: 138 MMHG

## 2024-06-18 DIAGNOSIS — R06.09 DYSPNEA ON EXERTION: ICD-10-CM

## 2024-06-18 DIAGNOSIS — R07.9 CHEST PAIN, UNSPECIFIED TYPE: ICD-10-CM

## 2024-06-18 LAB
ECHO AO ASC DIAM: 3.4 CM
ECHO AO ASCENDING AORTA INDEX: 1.39 CM/M2
ECHO AO ROOT DIAM: 1.8 CM
ECHO AO ROOT INDEX: 0.74 CM/M2
ECHO AO SINUS VALSALVA DIAM: 3.2 CM
ECHO AO SINUS VALSALVA INDEX: 1.31 CM/M2
ECHO AO ST JNCT DIAM: 3.1 CM
ECHO AV AREA PEAK VELOCITY: 2.7 CM2
ECHO AV AREA VTI: 2.4 CM2
ECHO AV AREA/BSA PEAK VELOCITY: 1.1 CM2/M2
ECHO AV AREA/BSA VTI: 1 CM2/M2
ECHO AV MEAN GRADIENT: 3 MMHG
ECHO AV MEAN VELOCITY: 0.8 M/S
ECHO AV PEAK GRADIENT: 5 MMHG
ECHO AV PEAK VELOCITY: 1.1 M/S
ECHO AV VELOCITY RATIO: 0.82
ECHO AV VTI: 20.8 CM
ECHO BSA: 2.52 M2
ECHO EST RA PRESSURE: 3 MMHG
ECHO IVC PROX: 1.4 CM
ECHO LA AREA 2C: 14.3 CM2
ECHO LA AREA 4C: 9.9 CM2
ECHO LA DIAMETER INDEX: 1.15 CM/M2
ECHO LA DIAMETER: 2.8 CM
ECHO LA MAJOR AXIS: 4.5 CM
ECHO LA MINOR AXIS: 4.3 CM
ECHO LA TO AORTIC ROOT RATIO: 1.56
ECHO LA VOL BP: 27 ML (ref 18–58)
ECHO LA VOL MOD A2C: 40 ML (ref 18–58)
ECHO LA VOL MOD A4C: 18 ML (ref 18–58)
ECHO LA VOL/BSA BIPLANE: 11 ML/M2 (ref 16–34)
ECHO LA VOLUME INDEX MOD A2C: 16 ML/M2 (ref 16–34)
ECHO LA VOLUME INDEX MOD A4C: 7 ML/M2 (ref 16–34)
ECHO LV E' LATERAL VELOCITY: 6 CM/S
ECHO LV E' SEPTAL VELOCITY: 5 CM/S
ECHO LV EDV A2C: 118 ML
ECHO LV EDV A4C: 137 ML
ECHO LV EDV INDEX A4C: 56 ML/M2
ECHO LV EDV NDEX A2C: 48 ML/M2
ECHO LV EJECTION FRACTION A2C: 61 %
ECHO LV EJECTION FRACTION A4C: 64 %
ECHO LV EJECTION FRACTION BIPLANE: 60 % (ref 55–100)
ECHO LV ESV A2C: 46 ML
ECHO LV ESV A4C: 50 ML
ECHO LV ESV INDEX A2C: 19 ML/M2
ECHO LV ESV INDEX A4C: 20 ML/M2
ECHO LV FRACTIONAL SHORTENING: 31 % (ref 28–44)
ECHO LV INTERNAL DIMENSION DIASTOLE INDEX: 2.01 CM/M2
ECHO LV INTERNAL DIMENSION DIASTOLIC: 4.9 CM (ref 4.2–5.9)
ECHO LV INTERNAL DIMENSION SYSTOLIC INDEX: 1.39 CM/M2
ECHO LV INTERNAL DIMENSION SYSTOLIC: 3.4 CM
ECHO LV IVSD: 1.1 CM (ref 0.6–1)
ECHO LV MASS 2D: 226.4 G (ref 88–224)
ECHO LV MASS INDEX 2D: 92.8 G/M2 (ref 49–115)
ECHO LV POSTERIOR WALL DIASTOLIC: 1.3 CM (ref 0.6–1)
ECHO LV RELATIVE WALL THICKNESS RATIO: 0.53
ECHO LVOT AREA: 3.5 CM2
ECHO LVOT AV VTI INDEX: 0.69
ECHO LVOT DIAM: 2.1 CM
ECHO LVOT MEAN GRADIENT: 1 MMHG
ECHO LVOT PEAK GRADIENT: 3 MMHG
ECHO LVOT PEAK VELOCITY: 0.9 M/S
ECHO LVOT STROKE VOLUME INDEX: 20.3 ML/M2
ECHO LVOT SV: 49.5 ML
ECHO LVOT VTI: 14.3 CM
ECHO MV A VELOCITY: 0.75 M/S
ECHO MV AREA VTI: 2.8 CM2
ECHO MV E DECELERATION TIME (DT): 254 MS
ECHO MV E VELOCITY: 0.47 M/S
ECHO MV E/A RATIO: 0.63
ECHO MV E/E' LATERAL: 7.83
ECHO MV E/E' RATIO (AVERAGED): 8.62
ECHO MV E/E' SEPTAL: 9.4
ECHO MV LVOT VTI INDEX: 1.26
ECHO MV MAX VELOCITY: 0.8 M/S
ECHO MV MEAN GRADIENT: 1 MMHG
ECHO MV MEAN VELOCITY: 0.5 M/S
ECHO MV PEAK GRADIENT: 3 MMHG
ECHO MV VTI: 18 CM
ECHO RA AREA 4C: 12.2 CM2
ECHO RA END SYSTOLIC VOLUME APICAL 4 CHAMBER INDEX BSA: 13 ML/M2
ECHO RA VOLUME: 31 ML
ECHO RV BASAL DIMENSION: 3.2 CM
ECHO RV INTERNAL DIMENSION: 3.4 CM
ECHO RV LONGITUDINAL DIMENSION: 8.6 CM
ECHO RV MID DIMENSION: 1.9 CM
ECHO RV TAPSE: 2 CM (ref 1.7–?)
NUC STRESS EJECTION FRACTION: 64 %
STRESS BASELINE DIAS BP: 85 MMHG
STRESS BASELINE HR: 83 BPM
STRESS BASELINE SYS BP: 124 MMHG
STRESS EXERCISE DUR MIN: 5 MIN
STRESS O2 SAT PEAK: 96 %
STRESS O2 SAT REST: 97 %
STRESS PEAK DIAS BP: 77 MMHG
STRESS PEAK SYS BP: 116 MMHG
STRESS PERCENT HR ACHIEVED: 66 %
STRESS POST PEAK HR: 100 BPM
STRESS RATE PRESSURE PRODUCT: NORMAL BPM*MMHG
STRESS ST DEPRESSION: 0 MM
STRESS STAGE 1 BP: NORMAL MMHG
STRESS STAGE 1 COMMENTS: NORMAL
STRESS STAGE 1 DURATION: 1 MIN:SEC
STRESS STAGE 1 HR: 78 BPM
STRESS STAGE 2 BP: NORMAL MMHG
STRESS STAGE 2 COMMENTS: NORMAL
STRESS STAGE 2 DURATION: 1 MIN:SEC
STRESS STAGE 2 HR: 97 BPM
STRESS STAGE 3 BP: NORMAL MMHG
STRESS STAGE 3 COMMENTS: NORMAL
STRESS STAGE 3 DURATION: 1 MIN:SEC
STRESS STAGE 3 HR: 85 BPM
STRESS STAGE 4 BP: NORMAL MMHG
STRESS STAGE 4 COMMENTS: NORMAL
STRESS STAGE 4 DURATION: 1 MIN:SEC
STRESS STAGE 4 HR: 88 BPM
STRESS STAGE 5 DURATION: 1 MIN:SEC
STRESS STAGE 5 HR: 96 BPM
STRESS STAGE RECOVERY 1 BP: NORMAL MMHG
STRESS STAGE RECOVERY 1 COMMENTS: NORMAL
STRESS STAGE RECOVERY 1 DURATION: 2 MIN:SEC
STRESS STAGE RECOVERY 1 HR: 86 BPM
STRESS TARGET HR: 152 BPM
TID: 1

## 2024-06-18 PROCEDURE — 93017 CV STRESS TEST TRACING ONLY: CPT

## 2024-06-18 PROCEDURE — 6360000002 HC RX W HCPCS: Performed by: INTERNAL MEDICINE

## 2024-06-18 PROCEDURE — 3430000000 HC RX DIAGNOSTIC RADIOPHARMACEUTICAL: Performed by: NURSE PRACTITIONER

## 2024-06-18 PROCEDURE — 6360000004 HC RX CONTRAST MEDICATION: Performed by: NURSE PRACTITIONER

## 2024-06-18 PROCEDURE — 2580000003 HC RX 258: Performed by: NURSE PRACTITIONER

## 2024-06-18 PROCEDURE — A9502 TC99M TETROFOSMIN: HCPCS | Performed by: NURSE PRACTITIONER

## 2024-06-18 PROCEDURE — C8929 TTE W OR WO FOL WCON,DOPPLER: HCPCS

## 2024-06-18 PROCEDURE — 78452 HT MUSCLE IMAGE SPECT MULT: CPT

## 2024-06-18 RX ORDER — SODIUM CHLORIDE 0.9 % (FLUSH) 0.9 %
5-40 SYRINGE (ML) INJECTION PRN
Status: ACTIVE | OUTPATIENT
Start: 2024-06-18 | End: 2024-06-19

## 2024-06-18 RX ORDER — REGADENOSON 0.08 MG/ML
0.4 INJECTION, SOLUTION INTRAVENOUS
Status: COMPLETED | OUTPATIENT
Start: 2024-06-18 | End: 2024-06-18

## 2024-06-18 RX ADMIN — SODIUM CHLORIDE, PRESERVATIVE FREE 20 ML: 5 INJECTION INTRAVENOUS at 11:32

## 2024-06-18 RX ADMIN — TETROFOSMIN 8 MILLICURIE: 0.23 INJECTION, POWDER, LYOPHILIZED, FOR SOLUTION INTRAVENOUS at 08:00

## 2024-06-18 RX ADMIN — PERFLUTREN 1.5 ML: 6.52 INJECTION, SUSPENSION INTRAVENOUS at 11:33

## 2024-06-18 RX ADMIN — TETROFOSMIN 24 MILLICURIE: 0.23 INJECTION, POWDER, LYOPHILIZED, FOR SOLUTION INTRAVENOUS at 13:33

## 2024-06-18 RX ADMIN — REGADENOSON 0.4 MG: 0.08 INJECTION, SOLUTION INTRAVENOUS at 09:59

## 2024-07-09 RX ORDER — ACETAMINOPHEN 325 MG/1
650 TABLET ORAL EVERY 4 HOURS PRN
Status: DISCONTINUED | OUTPATIENT
Start: 2024-07-09 | End: 2024-07-12 | Stop reason: HOSPADM

## 2024-07-09 RX ORDER — BISACODYL 5 MG/1
5 TABLET, DELAYED RELEASE ORAL DAILY PRN
Status: DISCONTINUED | OUTPATIENT
Start: 2024-07-09 | End: 2024-07-12 | Stop reason: HOSPADM

## 2024-07-09 RX ORDER — SODIUM CHLORIDE 0.9 % (FLUSH) 0.9 %
10 SYRINGE (ML) INJECTION AS NEEDED
Status: DISCONTINUED | OUTPATIENT
Start: 2024-07-09 | End: 2024-07-12 | Stop reason: HOSPADM

## 2024-07-09 RX ORDER — POLYETHYLENE GLYCOL 3350 17 G/17G
17 POWDER, FOR SOLUTION ORAL DAILY PRN
Status: DISCONTINUED | OUTPATIENT
Start: 2024-07-09 | End: 2024-07-12 | Stop reason: HOSPADM

## 2024-07-09 RX ORDER — ONDANSETRON 2 MG/ML
4 INJECTION INTRAMUSCULAR; INTRAVENOUS EVERY 6 HOURS PRN
Status: DISCONTINUED | OUTPATIENT
Start: 2024-07-09 | End: 2024-07-12 | Stop reason: HOSPADM

## 2024-07-09 RX ORDER — ACETAMINOPHEN 160 MG/5ML
650 SOLUTION ORAL EVERY 4 HOURS PRN
Status: DISCONTINUED | OUTPATIENT
Start: 2024-07-09 | End: 2024-07-12 | Stop reason: HOSPADM

## 2024-07-09 RX ORDER — ONDANSETRON 4 MG/1
4 TABLET, ORALLY DISINTEGRATING ORAL EVERY 6 HOURS PRN
Status: DISCONTINUED | OUTPATIENT
Start: 2024-07-09 | End: 2024-07-12 | Stop reason: HOSPADM

## 2024-07-09 RX ORDER — NITROGLYCERIN 0.4 MG/1
0.4 TABLET SUBLINGUAL
Status: DISCONTINUED | OUTPATIENT
Start: 2024-07-09 | End: 2024-07-12 | Stop reason: HOSPADM

## 2024-07-09 RX ORDER — AMOXICILLIN 250 MG
2 CAPSULE ORAL 2 TIMES DAILY PRN
Status: DISCONTINUED | OUTPATIENT
Start: 2024-07-09 | End: 2024-07-12 | Stop reason: HOSPADM

## 2024-07-09 RX ORDER — SODIUM CHLORIDE 9 MG/ML
40 INJECTION, SOLUTION INTRAVENOUS AS NEEDED
Status: DISCONTINUED | OUTPATIENT
Start: 2024-07-09 | End: 2024-07-12 | Stop reason: HOSPADM

## 2024-07-09 RX ORDER — ACETAMINOPHEN 120 MG/1
650 SUPPOSITORY RECTAL EVERY 4 HOURS PRN
Status: DISCONTINUED | OUTPATIENT
Start: 2024-07-09 | End: 2024-07-12 | Stop reason: HOSPADM

## 2024-07-09 RX ORDER — BISACODYL 10 MG
10 SUPPOSITORY, RECTAL RECTAL DAILY PRN
Status: DISCONTINUED | OUTPATIENT
Start: 2024-07-09 | End: 2024-07-12 | Stop reason: HOSPADM

## 2024-07-09 RX ORDER — SODIUM CHLORIDE 0.9 % (FLUSH) 0.9 %
10 SYRINGE (ML) INJECTION EVERY 12 HOURS SCHEDULED
Status: DISCONTINUED | OUTPATIENT
Start: 2024-07-09 | End: 2024-07-12 | Stop reason: HOSPADM

## 2024-07-10 ENCOUNTER — HOSPITAL ENCOUNTER (INPATIENT)
Facility: HOSPITAL | Age: 69
LOS: 2 days | Discharge: HOME | End: 2024-07-12
Attending: FAMILY MEDICINE | Admitting: FAMILY MEDICINE
Payer: MEDICARE

## 2024-07-10 ENCOUNTER — APPOINTMENT (OUTPATIENT)
Dept: GENERAL RADIOLOGY | Facility: HOSPITAL | Age: 69
End: 2024-07-10
Payer: MEDICARE

## 2024-07-10 PROBLEM — K21.9 GERD WITHOUT ESOPHAGITIS: Chronic | Status: ACTIVE | Noted: 2024-07-10

## 2024-07-10 PROBLEM — R07.9 CHEST PAIN: Status: ACTIVE | Noted: 2024-07-10

## 2024-07-10 LAB
ALBUMIN SERPL-MCNC: 3.9 G/DL (ref 3.5–5.2)
ALBUMIN/GLOB SERPL: 1.3 G/DL
ALP SERPL-CCNC: 58 U/L (ref 39–117)
ALT SERPL W P-5'-P-CCNC: 25 U/L (ref 1–41)
ANION GAP SERPL CALCULATED.3IONS-SCNC: 8 MMOL/L (ref 5–15)
AST SERPL-CCNC: 15 U/L (ref 1–40)
BILIRUB SERPL-MCNC: 0.3 MG/DL (ref 0–1.2)
BUN SERPL-MCNC: 11 MG/DL (ref 8–23)
BUN/CREAT SERPL: 12.5 (ref 7–25)
CALCIUM SPEC-SCNC: 9.7 MG/DL (ref 8.6–10.5)
CHLORIDE SERPL-SCNC: 102 MMOL/L (ref 98–107)
CO2 SERPL-SCNC: 30 MMOL/L (ref 22–29)
CREAT SERPL-MCNC: 0.88 MG/DL (ref 0.76–1.27)
DEPRECATED RDW RBC AUTO: 42.5 FL (ref 37–54)
EGFRCR SERPLBLD CKD-EPI 2021: 93.7 ML/MIN/1.73
ERYTHROCYTE [DISTWIDTH] IN BLOOD BY AUTOMATED COUNT: 13 % (ref 12.3–15.4)
GLOBULIN UR ELPH-MCNC: 2.9 GM/DL
GLUCOSE SERPL-MCNC: 105 MG/DL (ref 65–99)
HCT VFR BLD AUTO: 45.3 % (ref 37.5–51)
HGB BLD-MCNC: 15.5 G/DL (ref 13–17.7)
MCH RBC QN AUTO: 30.9 PG (ref 26.6–33)
MCHC RBC AUTO-ENTMCNC: 34.2 G/DL (ref 31.5–35.7)
MCV RBC AUTO: 90.4 FL (ref 79–97)
NT-PROBNP SERPL-MCNC: 76 PG/ML (ref 0–900)
PLATELET # BLD AUTO: 163 10*3/MM3 (ref 140–450)
PMV BLD AUTO: 10.6 FL (ref 6–12)
POTASSIUM SERPL-SCNC: 4.3 MMOL/L (ref 3.5–5.2)
PROT SERPL-MCNC: 6.8 G/DL (ref 6–8.5)
RBC # BLD AUTO: 5.01 10*6/MM3 (ref 4.14–5.8)
SODIUM SERPL-SCNC: 140 MMOL/L (ref 136–145)
TROPONIN T SERPL HS-MCNC: <6 NG/L
WBC NRBC COR # BLD AUTO: 5.93 10*3/MM3 (ref 3.4–10.8)

## 2024-07-10 PROCEDURE — 83880 ASSAY OF NATRIURETIC PEPTIDE: CPT | Performed by: HOSPITALIST

## 2024-07-10 PROCEDURE — 80053 COMPREHEN METABOLIC PANEL: CPT | Performed by: HOSPITALIST

## 2024-07-10 PROCEDURE — 99222 1ST HOSP IP/OBS MODERATE 55: CPT | Performed by: HOSPITALIST

## 2024-07-10 PROCEDURE — 93010 ELECTROCARDIOGRAM REPORT: CPT | Performed by: INTERNAL MEDICINE

## 2024-07-10 PROCEDURE — 85027 COMPLETE CBC AUTOMATED: CPT | Performed by: HOSPITALIST

## 2024-07-10 PROCEDURE — 84484 ASSAY OF TROPONIN QUANT: CPT | Performed by: HOSPITALIST

## 2024-07-10 PROCEDURE — 93005 ELECTROCARDIOGRAM TRACING: CPT | Performed by: HOSPITALIST

## 2024-07-10 PROCEDURE — 71045 X-RAY EXAM CHEST 1 VIEW: CPT

## 2024-07-10 RX ORDER — OMEPRAZOLE 20 MG/1
20 CAPSULE, DELAYED RELEASE ORAL DAILY
COMMUNITY

## 2024-07-10 RX ORDER — ASPIRIN 81 MG/1
81 TABLET ORAL DAILY
Status: DISCONTINUED | OUTPATIENT
Start: 2024-07-10 | End: 2024-07-12 | Stop reason: HOSPADM

## 2024-07-10 RX ORDER — ISOSORBIDE MONONITRATE 30 MG/1
30 TABLET, EXTENDED RELEASE ORAL
Status: DISCONTINUED | OUTPATIENT
Start: 2024-07-10 | End: 2024-07-12

## 2024-07-10 RX ORDER — ROSUVASTATIN CALCIUM 10 MG/1
10 TABLET, COATED ORAL NIGHTLY
Status: DISCONTINUED | OUTPATIENT
Start: 2024-07-10 | End: 2024-07-12 | Stop reason: HOSPADM

## 2024-07-10 RX ORDER — PANTOPRAZOLE SODIUM 40 MG/1
40 TABLET, DELAYED RELEASE ORAL
Status: DISCONTINUED | OUTPATIENT
Start: 2024-07-11 | End: 2024-07-12 | Stop reason: HOSPADM

## 2024-07-10 RX ORDER — ASPIRIN 81 MG/1
81 TABLET ORAL DAILY
Status: DISCONTINUED | OUTPATIENT
Start: 2024-07-11 | End: 2024-07-10

## 2024-07-10 RX ADMIN — ISOSORBIDE MONONITRATE 30 MG: 30 TABLET, EXTENDED RELEASE ORAL at 21:15

## 2024-07-10 RX ADMIN — ROSUVASTATIN CALCIUM 10 MG: 10 TABLET, FILM COATED ORAL at 21:43

## 2024-07-10 RX ADMIN — ASPIRIN 81 MG: 81 TABLET, COATED ORAL at 21:15

## 2024-07-10 RX ADMIN — Medication 10 ML: at 21:17

## 2024-07-10 NOTE — PLAN OF CARE
Goal Outcome Evaluation:   Direct admission- A&Ox4, on room air, resting comfortably in bed. No c/o chest pain at this time. Admission questions completed & assessment done. IV done R wrist. Safety maintained. Will continue to monitor until change of shift.

## 2024-07-11 LAB
ANION GAP SERPL CALCULATED.3IONS-SCNC: 9 MMOL/L (ref 5–15)
BUN SERPL-MCNC: 12 MG/DL (ref 8–23)
BUN/CREAT SERPL: 13.6 (ref 7–25)
CALCIUM SPEC-SCNC: 9.4 MG/DL (ref 8.6–10.5)
CHLORIDE SERPL-SCNC: 102 MMOL/L (ref 98–107)
CHOLEST SERPL-MCNC: 140 MG/DL (ref 0–200)
CO2 SERPL-SCNC: 27 MMOL/L (ref 22–29)
CREAT SERPL-MCNC: 0.88 MG/DL (ref 0.76–1.27)
DEPRECATED RDW RBC AUTO: 43 FL (ref 37–54)
EGFRCR SERPLBLD CKD-EPI 2021: 93.7 ML/MIN/1.73
ERYTHROCYTE [DISTWIDTH] IN BLOOD BY AUTOMATED COUNT: 13.1 % (ref 12.3–15.4)
GLUCOSE SERPL-MCNC: 112 MG/DL (ref 65–99)
HCT VFR BLD AUTO: 42.5 % (ref 37.5–51)
HDLC SERPL-MCNC: 36 MG/DL (ref 40–60)
HGB BLD-MCNC: 14.1 G/DL (ref 13–17.7)
LDLC SERPL CALC-MCNC: 86 MG/DL (ref 0–100)
LDLC/HDLC SERPL: 2.37 {RATIO}
MCH RBC QN AUTO: 30.1 PG (ref 26.6–33)
MCHC RBC AUTO-ENTMCNC: 33.2 G/DL (ref 31.5–35.7)
MCV RBC AUTO: 90.8 FL (ref 79–97)
PLATELET # BLD AUTO: 169 10*3/MM3 (ref 140–450)
PMV BLD AUTO: 10.9 FL (ref 6–12)
POTASSIUM SERPL-SCNC: 4.1 MMOL/L (ref 3.5–5.2)
RBC # BLD AUTO: 4.68 10*6/MM3 (ref 4.14–5.8)
SODIUM SERPL-SCNC: 138 MMOL/L (ref 136–145)
TRIGL SERPL-MCNC: 93 MG/DL (ref 0–150)
VLDLC SERPL-MCNC: 18 MG/DL (ref 5–40)
WBC NRBC COR # BLD AUTO: 6.46 10*3/MM3 (ref 3.4–10.8)

## 2024-07-11 PROCEDURE — 80048 BASIC METABOLIC PNL TOTAL CA: CPT | Performed by: STUDENT IN AN ORGANIZED HEALTH CARE EDUCATION/TRAINING PROGRAM

## 2024-07-11 PROCEDURE — 80061 LIPID PANEL: CPT | Performed by: HOSPITALIST

## 2024-07-11 PROCEDURE — 25810000003 LACTATED RINGERS SOLUTION: Performed by: STUDENT IN AN ORGANIZED HEALTH CARE EDUCATION/TRAINING PROGRAM

## 2024-07-11 PROCEDURE — 85027 COMPLETE CBC AUTOMATED: CPT | Performed by: STUDENT IN AN ORGANIZED HEALTH CARE EDUCATION/TRAINING PROGRAM

## 2024-07-11 PROCEDURE — 99232 SBSQ HOSP IP/OBS MODERATE 35: CPT

## 2024-07-11 RX ORDER — RANOLAZINE 500 MG/1
500 TABLET, EXTENDED RELEASE ORAL EVERY 12 HOURS SCHEDULED
Status: DISCONTINUED | OUTPATIENT
Start: 2024-07-11 | End: 2024-07-12 | Stop reason: HOSPADM

## 2024-07-11 RX ADMIN — ROSUVASTATIN CALCIUM 10 MG: 10 TABLET, FILM COATED ORAL at 20:18

## 2024-07-11 RX ADMIN — RANOLAZINE 500 MG: 500 TABLET, FILM COATED, EXTENDED RELEASE ORAL at 20:18

## 2024-07-11 RX ADMIN — SODIUM CHLORIDE, POTASSIUM CHLORIDE, SODIUM LACTATE AND CALCIUM CHLORIDE 500 ML: 600; 310; 30; 20 INJECTION, SOLUTION INTRAVENOUS at 11:06

## 2024-07-11 RX ADMIN — ACETAMINOPHEN 650 MG: 325 TABLET, FILM COATED ORAL at 08:04

## 2024-07-11 RX ADMIN — RANOLAZINE 500 MG: 500 TABLET, FILM COATED, EXTENDED RELEASE ORAL at 13:06

## 2024-07-11 RX ADMIN — Medication 10 ML: at 11:21

## 2024-07-11 RX ADMIN — PANTOPRAZOLE SODIUM 40 MG: 40 TABLET, DELAYED RELEASE ORAL at 05:12

## 2024-07-11 RX ADMIN — ACETAMINOPHEN 650 MG: 325 TABLET, FILM COATED ORAL at 18:16

## 2024-07-11 RX ADMIN — ISOSORBIDE MONONITRATE 30 MG: 30 TABLET, EXTENDED RELEASE ORAL at 08:04

## 2024-07-11 RX ADMIN — Medication 10 ML: at 20:19

## 2024-07-11 NOTE — PROGRESS NOTES
[facility] -  Progress Note     LOS: 1 day   Patient Care Team:  Jerome Vidal MD as PCP - General (Family Medicine)    Chief Complaint: Follow-up chest pain    Subjective     Interval History:   Overnight the patient developed some positional dizziness.  Blood pressures have been soft this morning.  He is getting fluids from his attending service.  Patient states he has dizziness, lightheadedness, and a headache since taking Imdur.  Patient did have a episode of chest pain this morning.  He describes it to me as a left-sided chest pain that occurred at rest.    Review of Systems:     Review of Systems   Cardiovascular:  Positive for chest pain.     Objective     Vital Sign Min/Max for last 24 hours  Temp  Min: 97.5 °F (36.4 °C)  Max: 98.4 °F (36.9 °C)   BP  Min: 92/62  Max: 135/89   Pulse  Min: 66  Max: 84   Resp  Min: 18  Max: 20   SpO2  Min: 95 %  Max: 98 %   No data recorded   Weight  Min: 126 kg (277 lb 3.2 oz)  Max: 126 kg (277 lb 3.2 oz)         07/10/24  1803   Weight: 126 kg (277 lb 3.2 oz)       Telemetry: Normal sinus rhythm with rates predominantly in the 60s and 70s      Physical Exam:    Constitutional:       Appearance: Healthy appearance. Not in distress.   Neck:      Vascular: JVD normal.   Pulmonary:      Effort: Pulmonary effort is normal.      Breath sounds: Normal breath sounds. No wheezing. No rhonchi. No rales.   Cardiovascular:      PMI at left midclavicular line. Normal rate. Regular rhythm. Normal S1. Normal S2.       Murmurs: There is no murmur.      No gallop.  No click. No rub.   Edema:     Peripheral edema absent.   Musculoskeletal: Normal range of motion.      Cervical back: Normal range of motion. Skin:     General: Skin is warm and dry.   Neurological:      Mental Status: Alert and oriented to person, place and time.       Results Review:   Lab Results (last 72 hours)       Procedure Component Value Units Date/Time    Basic Metabolic Panel [[id number]]   (Abnormal) Collected: 07/11/24 0431    Specimen: Blood Updated: 07/11/24 0528     Glucose 112 mg/dL      BUN 12 mg/dL      Creatinine 0.88 mg/dL      Sodium 138 mmol/L      Potassium 4.1 mmol/L      Chloride 102 mmol/L      CO2 27.0 mmol/L      Calcium 9.4 mg/dL      BUN/Creatinine Ratio 13.6     Anion Gap 9.0 mmol/L      eGFR 93.7 mL/min/1.73     Narrative:      GFR Normal >60  Chronic Kidney Disease <60  Kidney Failure <15      Lipid Panel [[id number]]  (Abnormal) Collected: 07/11/24 0431    Specimen: Blood Updated: 07/11/24 0528     Total Cholesterol 140 mg/dL      Triglycerides 93 mg/dL      HDL Cholesterol 36 mg/dL      LDL Cholesterol  86 mg/dL      VLDL Cholesterol 18 mg/dL      LDL/HDL Ratio 2.37    Narrative:      Cholesterol Reference Ranges  (U.S. Department of Health and Human Services ATP III Classifications)    Desirable          <200 mg/dL  Borderline High    200-239 mg/dL  High Risk          >240 mg/dL      Triglyceride Reference Ranges  (U.S. Department of Health and Human Services ATP III Classifications)    Normal           <150 mg/dL  Borderline High  150-199 mg/dL  High             200-499 mg/dL  Very High        >500 mg/dL    HDL Reference Ranges  (U.S. Department of Health and Human Services ATP III Classifications)    Low     <40 mg/dl (major risk factor for CHD)  High    >60 mg/dl ('negative' risk factor for CHD)        LDL Reference Ranges  (U.S. Department of Health and Human Services ATP III Classifications)    Optimal          <100 mg/dL  Near Optimal     100-129 mg/dL  Borderline High  130-159 mg/dL  High             160-189 mg/dL  Very High        >189 mg/dL    CBC (No Diff) [[id number]]  (Normal) Collected: 07/11/24 0431    Specimen: Blood Updated: 07/11/24 0459     WBC 6.46 10*3/mm3      RBC 4.68 10*6/mm3      Hemoglobin 14.1 g/dL      Hematocrit 42.5 %      MCV 90.8 fL      MCH 30.1 pg      MCHC 33.2 g/dL      RDW 13.1 %      RDW-SD 43.0 fl      MPV 10.9 fL      Platelets 169 10*3/mm3      BNP [[id number]]  (Normal) Collected: 07/10/24 1931    Specimen: Blood Updated: 07/10/24 2023     proBNP 76.0 pg/mL     Narrative:      This assay is used as an aid in the diagnosis of individuals suspected of having heart failure. It can be used as an aid in the diagnosis of acute decompensated heart failure (ADHF) in patients presenting with signs and symptoms of ADHF to the emergency department (ED). In addition, NT-proBNP of <300 pg/mL indicates ADHF is not likely.    Age Range Result Interpretation  NT-proBNP Concentration (pg/mL:      <50             Positive            >450                   Gray                 300-450                    Negative             <300    50-75           Positive            >900                  Gray                300-900                  Negative            <300      >75             Positive            >1800                  Gray                300-1800                  Negative            <300    Comprehensive Metabolic Panel [[id number]]  (Abnormal) Collected: 07/10/24 1931    Specimen: Blood Updated: 07/10/24 1959     Glucose 105 mg/dL      BUN 11 mg/dL      Creatinine 0.88 mg/dL      Sodium 140 mmol/L      Potassium 4.3 mmol/L      Chloride 102 mmol/L      CO2 30.0 mmol/L      Calcium 9.7 mg/dL      Total Protein 6.8 g/dL      Albumin 3.9 g/dL      ALT (SGPT) 25 U/L      AST (SGOT) 15 U/L      Alkaline Phosphatase 58 U/L      Total Bilirubin 0.3 mg/dL      Globulin 2.9 gm/dL      A/G Ratio 1.3 g/dL      BUN/Creatinine Ratio 12.5     Anion Gap 8.0 mmol/L      eGFR 93.7 mL/min/1.73     Narrative:      GFR Normal >60  Chronic Kidney Disease <60  Kidney Failure <15      High Sensitivity Troponin T [[id number]]  (Normal) Collected: 07/10/24 1931    Specimen: Blood Updated: 07/10/24 1956     HS Troponin T <6 ng/L     Narrative:      High Sensitive Troponin T Reference Range:  <14.0 ng/L- Negative Female for AMI  <22.0 ng/L- Negative Male for AMI  >=14 - Abnormal Female indicating  "possible myocardial injury.  >=22 - Abnormal Male indicating possible myocardial injury.   Clinicians would have to utilize clinical acumen, EKG, Troponin, and serial changes to determine if it is an Acute Myocardial Infarction or myocardial injury due to an underlying chronic condition.         CBC (No Diff) [[id number]]  (Normal) Collected: 07/10/24 1931    Specimen: Blood Updated: 07/10/24 1940     WBC 5.93 10*3/mm3      RBC 5.01 10*6/mm3      Hemoglobin 15.5 g/dL      Hematocrit 45.3 %      MCV 90.4 fL      MCH 30.9 pg      MCHC 34.2 g/dL      RDW 13.0 %      RDW-SD 42.5 fl      MPV 10.6 fL      Platelets 163 10*3/mm3                 Echo EF Estimated  No results found for: \"ECHOEFEST\"      Cath Ejection Fraction Quantitative  No results found for: \"CATHEF\"        Medication Review: yes  Current Facility-Administered Medications   Medication Dose Route Frequency Provider Last Rate Last Admin    acetaminophen (TYLENOL) tablet 650 mg  650 mg Oral Q4H PRN Abdelrahman Rutherford APRN   650 mg at 07/11/24 0804    Or    acetaminophen (TYLENOL) 160 MG/5ML oral solution 650 mg  650 mg Oral Q4H PRN Abdelrahman Rutherford APRN        Or    acetaminophen (TYLENOL) suppository 660 mg  660 mg Rectal Q4H PRN Abdelrahman Rutherford APRN        aspirin EC tablet 81 mg  81 mg Oral Daily Jesus Armstrong MD   81 mg at 07/10/24 2115    sennosides-docusate (PERICOLACE) 8.6-50 MG per tablet 2 tablet  2 tablet Oral BID PRN Abdelrahman Rutherford APRN        And    polyethylene glycol (MIRALAX) packet 17 g  17 g Oral Daily PRN Abdelrahman Rutherford APRN        And    bisacodyl (DULCOLAX) EC tablet 5 mg  5 mg Oral Daily PRN Abdelrahman Rutherford APRN        And    bisacodyl (DULCOLAX) suppository 10 mg  10 mg Rectal Daily PRN Abdelrahman Rutherford APRN        Calcium Replacement - Follow Nurse / BPA Driven Protocol   Does not apply PRN Abdelrahman Rutherford APRN        [Held by provider] isosorbide mononitrate (IMDUR) 24 hr tablet 30 mg  30 mg Oral Q24H Nathaniel, " Jesus NUNEZ MD   30 mg at 07/11/24 0804    lactated ringers bolus 500 mL  500 mL Intravenous Once Kraig Valdivia  mL/hr at 07/11/24 1106 500 mL at 07/11/24 1106    Magnesium Standard Dose Replacement - Follow Nurse / BPA Driven Protocol   Does not apply PRN Abdelrahman Rutherford APRN        nitroglycerin (NITROSTAT) SL tablet 0.4 mg  0.4 mg Sublingual Q5 Min PRN Abdelrahman Rutherford APRN        ondansetron ODT (ZOFRAN-ODT) disintegrating tablet 4 mg  4 mg Oral Q6H PRN Abdelrahman Rutherford APRN        Or    ondansetron (ZOFRAN) injection 4 mg  4 mg Intravenous Q6H PRN Abdelrahman Rutherford APRN        pantoprazole (PROTONIX) EC tablet 40 mg  40 mg Oral Q AM Kraig Valdivia MD   40 mg at 07/11/24 0512    Phosphorus Replacement - Follow Nurse / BPA Driven Protocol   Does not apply PRN Abdelrahman Rutherford APRN        Potassium Replacement - Follow Nurse / BPA Driven Protocol   Does not apply PRN Abdelrahman Rutherford APRN        ranolazine (RANEXA) 12 hr tablet 500 mg  500 mg Oral Q12H Brendon Franz APRN        rosuvastatin (CRESTOR) tablet 10 mg  10 mg Oral Nightly Jesus Armstrong MD   10 mg at 07/10/24 2143    sodium chloride 0.9 % flush 10 mL  10 mL Intravenous Q12H Abdelrahman Rutherford APRN   10 mL at 07/11/24 1121    sodium chloride 0.9 % flush 10 mL  10 mL Intravenous PRN Abdelrahman Rutherford APRN        sodium chloride 0.9 % infusion 40 mL  40 mL Intravenous PRN Abdelrahman Rutherford APRN             Assessment & Plan       1.  Chest pain  2.  Shortness of breath  3.  Coronary artery disease-coronary calcifications noted on CT chest as well as abnormal stress test  4.  Abnormal stress test  5.  GERD  6.  Obesity    -Patient does not seem to be tolerating Imdur at this time with symptomatic hypotension, we will hold this for now  - Start Ranexa 500 mg twice daily  - Continue aspirin 81 mg daily as well as rosuvastatin 10 mg daily  - Patient does not have a lot of room for optimization of his antianginals, if still  having symptoms tomorrow could consider coronary angiography.    Further orders per Dr. Armstrong        Electronically signed by Brendon Franz, ANDRES, 07/11/24, 11:52 AM CDT.

## 2024-07-11 NOTE — CONSULTS
HPI:   Jerome Parnell is a 68-year-old male with the below pertinent medical history who is admitted for chest pain and shortness of breath.    The patient states that he started experiencing progressive dyspnea on exertion starting in May, and he was seen in the ED at [facility] in mid May at which time workup was overall reassuring including troponin 11 and proBNP 64.  CXR showed no acute findings, and CTA chest showed mild bronchial thickening and coronary atherosclerosis but no PE.  He proceeded with outpatient cardiac workup including an echo and stress test.  In June he started to experience exertional chest discomfort in addition to his shortness of breath, and his chest discomfort would resolve with rest.  This has remained somewhat stable.  His stress test ultimately was abnormal.  He presented to his PCPs office yesterday for his wellness visit and reports that he was visibly short of breath after walking into clinic, which prompted him to be directly admitted to the hospital for more expedited cardiology evaluation.  He had been scheduled in cardiology clinic next week.  He was directly admitted this evening and cardiology was consulted.    The time of my assessment, the patient denies any active chest pain.  He states that he has not had any acute worsening of his chest pain or shortness of breath in the past week.  He is more concerned about his progressive dyspnea on exertion.  He denies any other significant cardiac symptoms including palpitations, orthopnea, lightheadedness, or peripheral edema.    Medications: Reviewed    Review of Systems: All pertinent negatives and positives as noted above.  Otherwise, 10 systems reviewed and found to be negative.    Pertinent past medical/family/social history:  - GERD  - Obesity  - Former tobacco use  - Father had CAD with a bypass at age 63    O:  /83 (BP Location: Left arm, Patient Position: Lying)   Pulse 79   Temp 97.6 °F (36.4 °C) (Oral)   Resp 20  "  Ht 182.9 cm (72\")   Wt 126 kg (277 lb 3.2 oz)   SpO2 96%   BMI 37.60 kg/m²   Temp:  [97.5 °F (36.4 °C)-97.6 °F (36.4 °C)] 97.6 °F (36.4 °C)  Heart Rate:  [79-84] 79  Resp:  [20] 20  BP: (131-135)/(83-89) 131/83    Gen: male lying in bed in NAD  HEENT: NC/AT, sclera anicteric  Neck: Supple, JVD not elevated  CV: RRR, no m/r/g  Pulm: CTAB, NWOB  Abd: Soft, ND/NT  Ext: WWP, no edema  Neuro: Aox3, grossly nonfocal  Psych: Appropriate mood and affect      Diagnostic Data:    CMP   CMP          5/18/2024    10:12 7/10/2024    19:31   CMP   Glucose 163     105    BUN 17     11    Creatinine 1.5     0.88    EGFR  93.7    Sodium 139     140    Potassium 4.1     4.3    Chloride 100     102    Calcium 9.9     9.7    Total Protein 8.0     6.8    Albumin 4.5     3.9    Globulin  2.9    Total Bilirubin 0.5     0.3    Alkaline Phosphatase 73     58    AST (SGOT) 25     15    ALT (SGPT) 35     25    Albumin/Globulin Ratio  1.3    BUN/Creatinine Ratio  12.5    Anion Gap 15     8.0       Details          This result is from an external source.             CBC   CBC          5/18/2024    10:12 7/10/2024    19:31   CBC   WBC 7.7     5.93    RBC 5.79     5.01    Hemoglobin 17.8     15.5    Hematocrit 52.3     45.3    MCV 90.3     90.4    MCH 30.7     30.9    MCHC 34.0     34.2    RDW 13.0     13.0    Platelets 250     163       Details          This result is from an external source.             Pro-BNP       Lab 07/10/24  1931   PROBNP 76.0     Troponin   HS Troponin T   Date/Time Value Ref Range Status   07/10/2024 1931 <6 <22 ng/L Final     Echo 6/18/2024: LVEF 60 to 65%, normal LV size/thickness, normal diastolic function, normal RV size/function, normal biatrial size, no hemodynamically significant valve abnormalities.    Lexiscan myocardial perfusion SPECT 6/18/2024: Moderate risk study.  Mild severity stress perfusion defect that is small in size in the inferoseptal and inferior segments and partially " reversible.    ASSESSMENT/PLAN:    1.  Chest pain  2.  Shortness of breath  3.  Coronary artery disease-coronary calcifications noted on CT chest as well as abnormal stress test  4.  Abnormal stress test  5.  GERD  6.  Obesity    Symptoms consistent with angina although unclear if shortness of breath is fully an anginal equivalent.  He does have a smoking history and had some bronchial wall thickening on CT at [facility].  He may benefit from PFTs.  He also has risk factors for sleep apnea and may benefit from a sleep study.  Although he does have progressive symptoms, he has not been on any antianginal therapy.  He does not have any urgent indications for cath at this time as his troponin is normal and presentation is overall not consistent with ACS.  I discussed options with him, and he favored a noninvasive strategy if able.  Given this, I think it is reasonable to try to start him on antianginal therapy and assess for improvement in symptoms with consideration to cath if having refractory symptoms consistent with the ISCHEMIA trial..    - Start Imdur 30 mg daily tonight  - Monitor BP and consider adding another antianginal medication (calcium channel blocker or beta-blocker) tomorrow  - Start aspirin 81 mg daily  - Start rosuvastatin 10 mg daily  - Continue Protonix

## 2024-07-11 NOTE — H&P
History and Physical      CHIEF COMPLAINT: Chest pain    Reason for Admission: Chest pain    History Obtained From: Patient, staff    HISTORY OF PRESENT ILLNESS:      The patient is a 68 y.o. male with significant past medical history of GERD, hyperlipidemia, and family history of coronary artery disease who presents with chest pain.  He initially had episode earlier this summer where he presented to [facility] ER for chest pain where he had a detectable troponin without delta.  He ultimately underwent stress test which was deemed to be high risk in mid June but has been unable to get into cardiology clinic for evaluation.  Over the last month or so he has had intermittent episodes of chest pain and shortness of breath both at rest and during exertion.  He was directly admitted yesterday for more expedited cardiology evaluation.  He has been started on antianginals including Imdur but states he had some orthostatic symptoms when standing last night and this morning.    He is having some mild chest discomfort that he describes as a pressure across his chest this morning while he is at rest.    Past Medical History:    History reviewed. No pertinent past medical history.    Past Surgical History:    History reviewed. No pertinent surgical history.    Medications Prior to Admission:    Medications Prior to Admission   Medication Sig Dispense Refill Last Dose    omeprazole (priLOSEC) 20 MG capsule Take 1 capsule by mouth Daily.   7/10/2024       Allergies:  Sulfa antibiotics    Social History:   Social History     Socioeconomic History    Marital status:    Tobacco Use    Smoking status: Former     Current packs/day: 15.00     Average packs/day: 9.7 packs/day for 39.5 years (382.9 ttl pk-yrs)     Types: Cigarettes     Start date: 01/2000    Smokeless tobacco: Never   Vaping Use    Vaping status: Never Used   Substance and Sexual Activity    Alcohol use: Never    Drug use: Never    Sexual activity: Defer      Partners: Male       Family History:   History reviewed. No pertinent family history.    REVIEW OF SYSTEMS:    CONSTITUTIONAL:  Negative for anorexia, chills, fevers, night sweats and weight loss.  Patient has been relatively medically stable until illness outlined in HPI  EYES:  Negative for eye dryness, icterus and redness, no significant changes in vision  HEENT:  Negative for dental problems, epistaxis or sinus congestion.  No history of facial trauma or difficulty swallowing  RESPIRATORY:  Negative for chest tightness, cough, dyspnea on exertion, pneumonia or worse sputum production  CARDIOVASCULAR: As stated in HPI  GASTROINTESTINAL:  Negative for abdominal pain, nausea or vomiting.  No history of hematemesis, jaundice, melena or rectal bleeding  MUSCULOSKELETAL:  Negative for muscle weakness, myalgias and neck pain.  No significant arthralgias  NEUROLOGICAL:   Negative for dizziness, headaches, seizures, speech problems, tremors and vertigo.  Mentation has been at baseline  INTEGUMENT: Negative for pruritus, rash, skin color change and skin lesion(s)       Vital Signs   Temp:  [97.5 °F (36.4 °C)-98.4 °F (36.9 °C)] 97.5 °F (36.4 °C)  Heart Rate:  [66-84] 72  Resp:  [18-20] 18  BP: ()/(61-89) 104/67          Physical Exam:  Constitutional: The patient is oriented to person, place, and time. They appear well-developed.  Able to answer questions appropriately  HEENT: Grossly normal, sitting up in bed  Head: Normocephalic and atraumatic.   Eyes: Pupils are equal, round, and reactive to light.  Extraocular muscles appear to be intact  Neck: Neck supple without masses or carotid bruit.  Cardiovascular: Regular rhythm and normal heart sounds.  No significant lift, rub or murmur appreciated  Pulmonary/Chest: Effort normal and breath sounds normal. CTAB, no appreciable rales or wheezes  Abdominal: Soft. Bowel sounds are normal. There is  no distension or tenderness appreciated. There is no rebound and no  guarding.   Musculoskeletal: Normal range of motion. There is  no edema or tenderness.  No significant joint swelling  Neurological: Patient is alert and oriented to person, place, and time. They have normal reflexes. CN 2-12 appear grossly intact  Skin: Skin is warm and dry without significant rashes     Results Review:   I reviewed the patient's new imaging results and agree with the interpretation.    DATA:  Lab Results (last 24 hours)       Procedure Component Value Units Date/Time    Basic Metabolic Panel [[id number]]  (Abnormal) Collected: 07/11/24 0431    Specimen: Blood Updated: 07/11/24 0528     Glucose 112 mg/dL      BUN 12 mg/dL      Creatinine 0.88 mg/dL      Sodium 138 mmol/L      Potassium 4.1 mmol/L      Chloride 102 mmol/L      CO2 27.0 mmol/L      Calcium 9.4 mg/dL      BUN/Creatinine Ratio 13.6     Anion Gap 9.0 mmol/L      eGFR 93.7 mL/min/1.73     Narrative:      GFR Normal >60  Chronic Kidney Disease <60  Kidney Failure <15      Lipid Panel [[id number]]  (Abnormal) Collected: 07/11/24 0431    Specimen: Blood Updated: 07/11/24 0528     Total Cholesterol 140 mg/dL      Triglycerides 93 mg/dL      HDL Cholesterol 36 mg/dL      LDL Cholesterol  86 mg/dL      VLDL Cholesterol 18 mg/dL      LDL/HDL Ratio 2.37    Narrative:      Cholesterol Reference Ranges  (U.S. Department of Health and Human Services ATP III Classifications)    Desirable          <200 mg/dL  Borderline High    200-239 mg/dL  High Risk          >240 mg/dL      Triglyceride Reference Ranges  (U.S. Department of Health and Human Services ATP III Classifications)    Normal           <150 mg/dL  Borderline High  150-199 mg/dL  High             200-499 mg/dL  Very High        >500 mg/dL    HDL Reference Ranges  (U.S. Department of Health and Human Services ATP III Classifications)    Low     <40 mg/dl (major risk factor for CHD)  High    >60 mg/dl ('negative' risk factor for CHD)        LDL Reference Ranges  (U.S. Department of Health and  Human Services ATP III Classifications)    Optimal          <100 mg/dL  Near Optimal     100-129 mg/dL  Borderline High  130-159 mg/dL  High             160-189 mg/dL  Very High        >189 mg/dL    CBC (No Diff) [[id number]]  (Normal) Collected: 07/11/24 0431    Specimen: Blood Updated: 07/11/24 0459     WBC 6.46 10*3/mm3      RBC 4.68 10*6/mm3      Hemoglobin 14.1 g/dL      Hematocrit 42.5 %      MCV 90.8 fL      MCH 30.1 pg      MCHC 33.2 g/dL      RDW 13.1 %      RDW-SD 43.0 fl      MPV 10.9 fL      Platelets 169 10*3/mm3     BNP [[id number]]  (Normal) Collected: 07/10/24 1931    Specimen: Blood Updated: 07/10/24 2023     proBNP 76.0 pg/mL     Narrative:      This assay is used as an aid in the diagnosis of individuals suspected of having heart failure. It can be used as an aid in the diagnosis of acute decompensated heart failure (ADHF) in patients presenting with signs and symptoms of ADHF to the emergency department (ED). In addition, NT-proBNP of <300 pg/mL indicates ADHF is not likely.    Age Range Result Interpretation  NT-proBNP Concentration (pg/mL:      <50             Positive            >450                   Gray                 300-450                    Negative             <300    50-75           Positive            >900                  Gray                300-900                  Negative            <300      >75             Positive            >1800                  Gray                300-1800                  Negative            <300    Comprehensive Metabolic Panel [[id number]]  (Abnormal) Collected: 07/10/24 1931    Specimen: Blood Updated: 07/10/24 1959     Glucose 105 mg/dL      BUN 11 mg/dL      Creatinine 0.88 mg/dL      Sodium 140 mmol/L      Potassium 4.3 mmol/L      Chloride 102 mmol/L      CO2 30.0 mmol/L      Calcium 9.7 mg/dL      Total Protein 6.8 g/dL      Albumin 3.9 g/dL      ALT (SGPT) 25 U/L      AST (SGOT) 15 U/L      Alkaline Phosphatase 58 U/L      Total Bilirubin 0.3 mg/dL       Globulin 2.9 gm/dL      A/G Ratio 1.3 g/dL      BUN/Creatinine Ratio 12.5     Anion Gap 8.0 mmol/L      eGFR 93.7 mL/min/1.73     Narrative:      GFR Normal >60  Chronic Kidney Disease <60  Kidney Failure <15      High Sensitivity Troponin T [[id number]]  (Normal) Collected: 07/10/24 1931    Specimen: Blood Updated: 07/10/24 1956     HS Troponin T <6 ng/L     Narrative:      High Sensitive Troponin T Reference Range:  <14.0 ng/L- Negative Female for AMI  <22.0 ng/L- Negative Male for AMI  >=14 - Abnormal Female indicating possible myocardial injury.  >=22 - Abnormal Male indicating possible myocardial injury.   Clinicians would have to utilize clinical acumen, EKG, Troponin, and serial changes to determine if it is an Acute Myocardial Infarction or myocardial injury due to an underlying chronic condition.         CBC (No Diff) [[id number]]  (Normal) Collected: 07/10/24 1931    Specimen: Blood Updated: 07/10/24 1940     WBC 5.93 10*3/mm3      RBC 5.01 10*6/mm3      Hemoglobin 15.5 g/dL      Hematocrit 45.3 %      MCV 90.4 fL      MCH 30.9 pg      MCHC 34.2 g/dL      RDW 13.0 %      RDW-SD 42.5 fl      MPV 10.6 fL      Platelets 163 10*3/mm3           Imaging Results (Last 24 Hours)       Procedure Component Value Units Date/Time    XR Chest 1 View [[id number]] Collected: 07/10/24 2025     Updated: 07/10/24 2029    Narrative:      XR CHEST 1 VW-     HISTORY: chest pain     COMPARISON: None     FINDINGS: Frontal view the chest obtained.     Medial right basilar opacities may relate to atelectasis or  consolidation. Left lung appears clear. Heart borderline enlarged. No  pleural effusion or pneumothorax. No acute regional bony pathology.       Impression:      1. Medial right basilar opacities for which atelectasis or consolidation  considered.        This report was signed and finalized on 7/10/2024 8:26 PM by Dr. Gissel Martinez MD.                 ASSESSMENT AND PLAN:      1.     Chest pain    GERD without  esophagitis    Assessment: 68-year-old male who presents for chest pain.    Plan:    Angina  Attempting to uptitrate antianginal meds, he is only been on this for couple hours so it is difficult to assess if it is truly working or not.  He is having some orthostatic symptoms which may or may not be related to starting Imdur but I will give him a little IV fluid to remove dehydration as a possible etiology.  Will see how he progresses and will do heart cath if he has refractory symptoms or is unable to tolerate antianginal meds.    Kraig Valdivia MD  08:14 CDT 7/11/2024

## 2024-07-11 NOTE — PLAN OF CARE
Goal Outcome Evaluation:  Plan of Care Reviewed With: patient         Pt co of being SOA with exertion. Pt also co slight dizziness after walking, BP was 99/55. BP is running soft. 500 ml LR bolus given per orders. Pt has complaints of mild headache; see MAR. Pt taken off NPO and tolerated lunch well. Safety maintained during shift. Will continue to monitor.

## 2024-07-11 NOTE — PLAN OF CARE
Goal Outcome Evaluation:           Progress: improving  Outcome Evaluation: Pt was S 66-79 on tele. Pt slept well through the night. Pt NPO after midnight in case doctor decides to do anything this am. Pt complains of no chest pain. Sats within normal limits on room air.

## 2024-07-12 VITALS
BODY MASS INDEX: 37.55 KG/M2 | DIASTOLIC BLOOD PRESSURE: 81 MMHG | HEART RATE: 72 BPM | RESPIRATION RATE: 18 BRPM | TEMPERATURE: 97.5 F | HEIGHT: 72 IN | WEIGHT: 277.2 LBS | SYSTOLIC BLOOD PRESSURE: 124 MMHG | OXYGEN SATURATION: 98 %

## 2024-07-12 LAB
QT INTERVAL: 388 MS
QTC INTERVAL: 430 MS

## 2024-07-12 PROCEDURE — 99232 SBSQ HOSP IP/OBS MODERATE 35: CPT | Performed by: NURSE PRACTITIONER

## 2024-07-12 RX ORDER — NITROGLYCERIN 0.4 MG/1
0.4 TABLET SUBLINGUAL
Qty: 30 TABLET | Refills: 1 | Status: SHIPPED | OUTPATIENT
Start: 2024-07-12

## 2024-07-12 RX ORDER — ASPIRIN 81 MG/1
81 TABLET ORAL DAILY
Qty: 90 TABLET | Refills: 1 | Status: SHIPPED | OUTPATIENT
Start: 2024-07-13

## 2024-07-12 RX ORDER — ROSUVASTATIN CALCIUM 10 MG/1
10 TABLET, COATED ORAL NIGHTLY
Qty: 90 TABLET | Refills: 3 | Status: SHIPPED | OUTPATIENT
Start: 2024-07-12

## 2024-07-12 RX ORDER — RANOLAZINE 500 MG/1
500 TABLET, EXTENDED RELEASE ORAL EVERY 12 HOURS SCHEDULED
Qty: 180 TABLET | Refills: 3 | Status: SHIPPED | OUTPATIENT
Start: 2024-07-12

## 2024-07-12 RX ADMIN — ASPIRIN 81 MG: 81 TABLET, COATED ORAL at 09:19

## 2024-07-12 RX ADMIN — Medication 10 ML: at 09:19

## 2024-07-12 RX ADMIN — RANOLAZINE 500 MG: 500 TABLET, FILM COATED, EXTENDED RELEASE ORAL at 09:19

## 2024-07-12 RX ADMIN — PANTOPRAZOLE SODIUM 40 MG: 40 TABLET, DELAYED RELEASE ORAL at 05:20

## 2024-07-12 NOTE — DISCHARGE SUMMARY
Hospital Discharge Summary    Jerome Parnell  :  1955  MRN:  [id number]    Admit date:  7/10/2024  Discharge date:  2024    Admitting Physician:    Jerome Vidal MD    Discharge Diagnoses:      Chest pain    GERD without esophagitis      Hospital Course:       68-year-old male with past medical history of GERD who presented for chest pain and positive stress test.  He was treated medically and did not tolerate Imdur.  Subsequently did tolerate Ranexa and will be on this for antianginals.    New medications include aspirin, statin, and Ranexa.  He will follow-up with myself and cardiology as an outpatient.      Discharge Medications:         Discharge Medications        New Medications        Instructions Start Date   aspirin 81 MG EC tablet   81 mg, Oral, Daily   Start Date: 2024     nitroglycerin 0.4 MG SL tablet  Commonly known as: NITROSTAT   0.4 mg, Sublingual, Every 5 Minutes PRN, Take no more than 3 doses in 15 minutes.      ranolazine 500 MG 12 hr tablet  Commonly known as: RANEXA   500 mg, Oral, Every 12 Hours Scheduled      rosuvastatin 10 MG tablet  Commonly known as: CRESTOR   10 mg, Oral, Nightly             Continue These Medications        Instructions Start Date   omeprazole 20 MG capsule  Commonly known as: priLOSEC   20 mg, Oral, Daily             Significant Diagnostic Studies:      XR Chest 1 View    Result Date: 7/10/2024  1. Medial right basilar opacities for which atelectasis or consolidation considered.   This report was signed and finalized on 7/10/2024 8:26 PM by Dr. Gissel Martinez MD.          Disposition:   Home  Follow up with Jerome Vidal MD in 1 weeks.    Signed:  Kraig Valdivia MD   2024, 15:47 CDT

## 2024-07-12 NOTE — PROGRESS NOTES
[facility] -  Progress Note     LOS: 2 days   Patient Care Team:  Jerome Vidal MD as PCP - General (Family Medicine)    Chief Complaint: chest pain follow up    Subjective     Interval History: The patient feels well. He has ambulated the hallway and reports no complaints of chest pain. He has had no further headaches since Imdur was stopped. Blood pressure has improved since stopping imdur.     Review of Systems:     Review of Systems   Respiratory:  Negative for chest tightness, shortness of breath and wheezing.    Cardiovascular:  Negative for chest pain, palpitations and leg swelling.   Gastrointestinal:  Negative for abdominal distention and abdominal pain.   Genitourinary:  Negative for difficulty urinating.   Neurological:  Negative for dizziness and headaches.     Objective     Vital Sign Min/Max for last 24 hours  Temp  Min: 97.4 °F (36.3 °C)  Max: 98.5 °F (36.9 °C)   BP  Min: 96/53  Max: 132/73   Pulse  Min: 64  Max: 72   Resp  Min: 18  Max: 18   SpO2  Min: 96 %  Max: 97 %   No data recorded   No data recorded         07/10/24  1803   Weight: 126 kg (277 lb 3.2 oz)       Physical Exam:    Vitals reviewed.   Constitutional:       General: Awake.      Appearance: Normal appearance. Well-developed, well-groomed and not in distress. Obese.   HENT:      Head: Normocephalic and atraumatic.   Pulmonary:      Effort: Pulmonary effort is normal.      Breath sounds: Normal breath sounds.   Cardiovascular:      Normal rate. Regular rhythm.   Edema:     Peripheral edema absent.   Musculoskeletal:      Cervical back: Normal range of motion and neck supple. Skin:     General: Skin is warm and dry.   Neurological:      Mental Status: Alert, oriented to person, place, and time and oriented to person, place and time.   Psychiatric:         Attention and Perception: Attention normal.         Mood and Affect: Mood normal.         Speech: Speech normal.         Behavior: Behavior normal.  Behavior is cooperative.         Thought Content: Thought content normal.         Cognition and Memory: Cognition and memory normal.         Judgment: Judgment normal.       Results Review:   Lab Results (last 72 hours)       Procedure Component Value Units Date/Time    Basic Metabolic Panel [[id number]]  (Abnormal) Collected: 07/11/24 0431    Specimen: Blood Updated: 07/11/24 0528     Glucose 112 mg/dL      BUN 12 mg/dL      Creatinine 0.88 mg/dL      Sodium 138 mmol/L      Potassium 4.1 mmol/L      Chloride 102 mmol/L      CO2 27.0 mmol/L      Calcium 9.4 mg/dL      BUN/Creatinine Ratio 13.6     Anion Gap 9.0 mmol/L      eGFR 93.7 mL/min/1.73     Narrative:      GFR Normal >60  Chronic Kidney Disease <60  Kidney Failure <15      Lipid Panel [[id number]]  (Abnormal) Collected: 07/11/24 0431    Specimen: Blood Updated: 07/11/24 0528     Total Cholesterol 140 mg/dL      Triglycerides 93 mg/dL      HDL Cholesterol 36 mg/dL      LDL Cholesterol  86 mg/dL      VLDL Cholesterol 18 mg/dL      LDL/HDL Ratio 2.37    Narrative:      Cholesterol Reference Ranges  (U.S. Department of Health and Human Services ATP III Classifications)    Desirable          <200 mg/dL  Borderline High    200-239 mg/dL  High Risk          >240 mg/dL      Triglyceride Reference Ranges  (U.S. Department of Health and Human Services ATP III Classifications)    Normal           <150 mg/dL  Borderline High  150-199 mg/dL  High             200-499 mg/dL  Very High        >500 mg/dL    HDL Reference Ranges  (U.S. Department of Health and Human Services ATP III Classifications)    Low     <40 mg/dl (major risk factor for CHD)  High    >60 mg/dl ('negative' risk factor for CHD)        LDL Reference Ranges  (U.S. Department of Health and Human Services ATP III Classifications)    Optimal          <100 mg/dL  Near Optimal     100-129 mg/dL  Borderline High  130-159 mg/dL  High             160-189 mg/dL  Very High        >189 mg/dL    CBC (No Diff) [[id number]]   (Normal) Collected: 07/11/24 0431    Specimen: Blood Updated: 07/11/24 0459     WBC 6.46 10*3/mm3      RBC 4.68 10*6/mm3      Hemoglobin 14.1 g/dL      Hematocrit 42.5 %      MCV 90.8 fL      MCH 30.1 pg      MCHC 33.2 g/dL      RDW 13.1 %      RDW-SD 43.0 fl      MPV 10.9 fL      Platelets 169 10*3/mm3     BNP [[id number]]  (Normal) Collected: 07/10/24 1931    Specimen: Blood Updated: 07/10/24 2023     proBNP 76.0 pg/mL     Narrative:      This assay is used as an aid in the diagnosis of individuals suspected of having heart failure. It can be used as an aid in the diagnosis of acute decompensated heart failure (ADHF) in patients presenting with signs and symptoms of ADHF to the emergency department (ED). In addition, NT-proBNP of <300 pg/mL indicates ADHF is not likely.    Age Range Result Interpretation  NT-proBNP Concentration (pg/mL:      <50             Positive            >450                   Gray                 300-450                    Negative             <300    50-75           Positive            >900                  Gray                300-900                  Negative            <300      >75             Positive            >1800                  Gray                300-1800                  Negative            <300    Comprehensive Metabolic Panel [[id number]]  (Abnormal) Collected: 07/10/24 1931    Specimen: Blood Updated: 07/10/24 1959     Glucose 105 mg/dL      BUN 11 mg/dL      Creatinine 0.88 mg/dL      Sodium 140 mmol/L      Potassium 4.3 mmol/L      Chloride 102 mmol/L      CO2 30.0 mmol/L      Calcium 9.7 mg/dL      Total Protein 6.8 g/dL      Albumin 3.9 g/dL      ALT (SGPT) 25 U/L      AST (SGOT) 15 U/L      Alkaline Phosphatase 58 U/L      Total Bilirubin 0.3 mg/dL      Globulin 2.9 gm/dL      A/G Ratio 1.3 g/dL      BUN/Creatinine Ratio 12.5     Anion Gap 8.0 mmol/L      eGFR 93.7 mL/min/1.73     Narrative:      GFR Normal >60  Chronic Kidney Disease <60  Kidney Failure <15      High  Sensitivity Troponin T [[id number]]  (Normal) Collected: 07/10/24 1931    Specimen: Blood Updated: 07/10/24 1956     HS Troponin T <6 ng/L     Narrative:      High Sensitive Troponin T Reference Range:  <14.0 ng/L- Negative Female for AMI  <22.0 ng/L- Negative Male for AMI  >=14 - Abnormal Female indicating possible myocardial injury.  >=22 - Abnormal Male indicating possible myocardial injury.   Clinicians would have to utilize clinical acumen, EKG, Troponin, and serial changes to determine if it is an Acute Myocardial Infarction or myocardial injury due to an underlying chronic condition.         CBC (No Diff) [[id number]]  (Normal) Collected: 07/10/24 1931    Specimen: Blood Updated: 07/10/24 1940     WBC 5.93 10*3/mm3      RBC 5.01 10*6/mm3      Hemoglobin 15.5 g/dL      Hematocrit 45.3 %      MCV 90.4 fL      MCH 30.9 pg      MCHC 34.2 g/dL      RDW 13.0 %      RDW-SD 42.5 fl      MPV 10.6 fL      Platelets 163 10*3/mm3            Medication Review: yes  Current Facility-Administered Medications   Medication Dose Route Frequency Provider Last Rate Last Admin    acetaminophen (TYLENOL) tablet 650 mg  650 mg Oral Q4H PRN Abdelrahman Rutherford APRN   650 mg at 07/11/24 1816    Or    acetaminophen (TYLENOL) 160 MG/5ML oral solution 650 mg  650 mg Oral Q4H PRN Abdelrahman Rutherford APRN        Or    acetaminophen (TYLENOL) suppository 660 mg  660 mg Rectal Q4H PRN Abdelrahman Rutherford APRN        aspirin EC tablet 81 mg  81 mg Oral Daily Jesus Armstrong MD   81 mg at 07/12/24 0919    sennosides-docusate (PERICOLACE) 8.6-50 MG per tablet 2 tablet  2 tablet Oral BID PRN Abdelrahman Rutherford APRN        And    polyethylene glycol (MIRALAX) packet 17 g  17 g Oral Daily PRN Abdelrahman Rutherford APRN        And    bisacodyl (DULCOLAX) EC tablet 5 mg  5 mg Oral Daily PRN Abdelrahman Rutherford APRN        And    bisacodyl (DULCOLAX) suppository 10 mg  10 mg Rectal Daily PRN Abdelrahman Rutherford APRN        Calcium Replacement - Follow Nurse /  BPA Driven Protocol   Does not apply PRN Abdelrahman Rutherford APRN        Magnesium Standard Dose Replacement - Follow Nurse / BPA Driven Protocol   Does not apply PRN Abdelrahman Rutherford APRN        nitroglycerin (NITROSTAT) SL tablet 0.4 mg  0.4 mg Sublingual Q5 Min PRN Abdelrahman Rutherford APRN        ondansetron ODT (ZOFRAN-ODT) disintegrating tablet 4 mg  4 mg Oral Q6H PRN Abdelrahman Rutherford APRN        Or    ondansetron (ZOFRAN) injection 4 mg  4 mg Intravenous Q6H PRN Abdelrahman Rutherford APRN        pantoprazole (PROTONIX) EC tablet 40 mg  40 mg Oral Q AM Kraig Valdivia MD   40 mg at 07/12/24 0520    Phosphorus Replacement - Follow Nurse / BPA Driven Protocol   Does not apply PRN Abdelrahman Rutherford APRN        Potassium Replacement - Follow Nurse / BPA Driven Protocol   Does not apply PRN Abdelrahman Rutherford APRN        ranolazine (RANEXA) 12 hr tablet 500 mg  500 mg Oral Q12H Brendon Franz APRN   500 mg at 07/12/24 0919    rosuvastatin (CRESTOR) tablet 10 mg  10 mg Oral Nightly Jesus Armstrong MD   10 mg at 07/11/24 2018    sodium chloride 0.9 % flush 10 mL  10 mL Intravenous Q12H Abdelrahman Rutherford APRN   10 mL at 07/12/24 0919    sodium chloride 0.9 % flush 10 mL  10 mL Intravenous PRN Abdelrahman Rutherford APRN        sodium chloride 0.9 % infusion 40 mL  40 mL Intravenous PRN Abdelrahman Rutherford APRN             Assessment & Plan     1.  Chest pain  2.  Shortness of breath  3.  Coronary artery calcifications noted on CT chest  4.  Abnormal nuclear stress test: performed as outpatient prior to admission  5.  GERD  6.  Obesity BMI 37.60      OK for discharge home.   The patient is feeling improved on Ranexa.  No further headaches or chest pain.  Would recommend ongoing use of ranolazine with outpatient follow-up to reevaluate symptoms and discuss need for further antianginal medication optimization.  We will schedule follow-up for the patient in the office.  Ranexa sent to his pharmacy for .        Electronically signed by ANDRES Carvalho, 07/12/24, 11:40 AM CDT.

## 2024-07-12 NOTE — PLAN OF CARE
Goal Outcome Evaluation:           Progress: improving  Outcome Evaluation: Pt was S 65-74 on tele. Pt slept on and off through the night. Pt complains of no chest pain. sats within normal limits on room air.

## 2024-07-12 NOTE — PROGRESS NOTES
"    Daily Progress Note  Jerome Parnell  MRN: [id number] LOS: 2    Admit Date: 7/10/2024   2024 08:22 CDT    Subjective:         Interval History:    Reviewed overnight events and nursing notes.     Got up and ambulated the halls without significant chest pain or shortness of breath yesterday.  Blood pressure much better controlled on Ranexa than Imdur.    ROS:  Review of Systems   Constitutional:  Negative for chills and fever.   Respiratory:  Negative for cough, chest tightness and shortness of breath.    Cardiovascular:  Negative for chest pain.   Gastrointestinal:  Negative for abdominal pain, diarrhea, nausea and vomiting.       DIET:  Diet: Cardiac; Healthy Heart (2-3 Na+); Fluid Consistency: Thin (IDDSI 0)    Medications:      aspirin, 81 mg, Oral, Daily  [Held by provider] isosorbide mononitrate, 30 mg, Oral, Q24H  pantoprazole, 40 mg, Oral, Q AM  ranolazine, 500 mg, Oral, Q12H  rosuvastatin, 10 mg, Oral, Nightly  sodium chloride, 10 mL, Intravenous, Q12H          Objective:     Vitals: /59 (BP Location: Left arm, Patient Position: Lying)   Pulse 68   Temp 97.6 °F (36.4 °C) (Oral)   Resp 18   Ht 182.9 cm (72\")   Wt 126 kg (277 lb 3.2 oz)   SpO2 97%   BMI 37.60 kg/m²    Intake/Output Summary (Last 24 hours) at 2024 0822  Last data filed at 2024 0340  Gross per 24 hour   Intake 420 ml   Output 150 ml   Net 270 ml    Temp (24hrs), Av °F (36.7 °C), Min:97.5 °F (36.4 °C), Max:98.5 °F (36.9 °C)    Glucose:  No results found for: \"POCGLU\"  Physical Examination:   Physical Exam  Constitutional:       General: He is not in acute distress.     Appearance: Normal appearance. He is normal weight. He is not ill-appearing.   Cardiovascular:      Rate and Rhythm: Normal rate and regular rhythm.      Pulses: Normal pulses.      Heart sounds: Normal heart sounds. No murmur heard.  Pulmonary:      Effort: Pulmonary effort is normal.      Breath sounds: Normal breath sounds. No wheezing, rhonchi " or rales.   Abdominal:      General: Abdomen is flat. Bowel sounds are normal.      Palpations: Abdomen is soft.      Tenderness: There is no abdominal tenderness.   Neurological:      Mental Status: He is alert.         Labs:  Lab Results (last 24 hours)       ** No results found for the last 24 hours. **             Imaging:  XR Chest 1 View    Result Date: 7/10/2024  XR CHEST 1 VW-  HISTORY: chest pain  COMPARISON: None  FINDINGS: Frontal view the chest obtained.  Medial right basilar opacities may relate to atelectasis or consolidation. Left lung appears clear. Heart borderline enlarged. No pleural effusion or pneumothorax. No acute regional bony pathology.      Impression: 1. Medial right basilar opacities for which atelectasis or consolidation considered.   This report was signed and finalized on 7/10/2024 8:26 PM by Dr. Gissel Martinez MD.          Assessment and Plan:     Primary Problem:  Chest pain    Hospital Problem list:    Chest pain    GERD without esophagitis        Assessment: 68-year-old male who presents for chest pain.     Plan:     Coronary artery disease with angina  Much better controlled on Ranexa.  Continue aspirin and statin, probably stable for discharge if cardiology agreeable.         Reviewed treatment plans with the patient and/or family.     Code Status:   Code Status and Medical Interventions:   Ordered at: 07/09/24 1642     Code Status (Patient has no pulse and is not breathing):    CPR (Attempt to Resuscitate)     Medical Interventions (Patient has pulse or is breathing):    Full Support       Electronically signed by Kraig Valdivia MD on 7/12/2024 at 08:22 CDT

## 2024-07-30 ENCOUNTER — OFFICE VISIT (OUTPATIENT)
Dept: CARDIOLOGY | Facility: CLINIC | Age: 69
End: 2024-07-30
Payer: MEDICARE

## 2024-07-30 VITALS
WEIGHT: 275 LBS | BODY MASS INDEX: 37.25 KG/M2 | RESPIRATION RATE: 18 BRPM | HEART RATE: 87 BPM | DIASTOLIC BLOOD PRESSURE: 80 MMHG | HEIGHT: 72 IN | OXYGEN SATURATION: 95 % | SYSTOLIC BLOOD PRESSURE: 114 MMHG

## 2024-07-30 DIAGNOSIS — E78.5 DYSLIPIDEMIA: ICD-10-CM

## 2024-07-30 DIAGNOSIS — I25.118 CORONARY ARTERY DISEASE OF NATIVE ARTERY OF NATIVE HEART WITH STABLE ANGINA PECTORIS: Primary | ICD-10-CM

## 2024-07-30 RX ORDER — RANOLAZINE 1000 MG/1
1000 TABLET, EXTENDED RELEASE ORAL EVERY 12 HOURS SCHEDULED
Qty: 180 TABLET | Refills: 3 | Status: SHIPPED | OUTPATIENT
Start: 2024-07-30

## 2024-07-30 RX ORDER — BUDESONIDE, GLYCOPYRROLATE, AND FORMOTEROL FUMARATE 160; 9; 4.8 UG/1; UG/1; UG/1
2 AEROSOL, METERED RESPIRATORY (INHALATION) 2 TIMES DAILY
COMMUNITY
Start: 2024-07-19 | End: 2024-07-30

## 2024-07-30 NOTE — PROGRESS NOTES
Reason For Visit:  Hospital follow-up    Subjective        Jerome Parnell is a 69 y.o. male with the below pertinent PMH who presents for hospital follow-up.    Jerome Parnell was initially seen by me in the hospital 7/10/2024 at which time he had been admitted due to shortness of breath with occasional chest discomfort with a recent abnormal stress test.  He was started on rosuvastatin, aspirin, and Imdur.  He did not tolerate Imdur due to low BP with dizziness, so this was discontinued and he was started on Ranexa.  He tolerated this well and was discharged with plan for outpatient cardiology follow-up.    Today, the patient reports that he has been feeling better since hospital discharge.  He has noticed improvement in his shortness of breath and quicker recovery time from activities, but he is still having some dyspnea on exertion requiring him to rest occasionally during exertion.  He has not had any exertional chest pain.  He denies palpitations, lightheadedness, and peripheral edema.  He does mention that his PCP gave him a trial of Breztri that he does not feel helped his breathing but did lead to thrush.    ROS: Pertinent findings are included above.    Cardiac Studies  Echo 6/18/2024: LVEF 60 to 65%, normal LV size/thickness, normal diastolic function, normal RV size/function, normal biatrial size, no hemodynamically significant valve abnormalities.  Lexiscan myocardial perfusion SPECT 6/18/2024: Moderate risk study.  Mild severity stress perfusion defect that is small in size in the inferoseptal and inferior segments and partially reversible.    Pertinent PMH  CAD  Dyslipidemia  GERD  Obesity  Former tobacco use  Family history of ASCVD (father had CAD with CABG at age 63)    Pertinent past medical, surgical, family, and social history were reviewed.      Current Outpatient Medications:     aspirin 81 MG EC tablet, Take 1 tablet by mouth Daily., Disp: 90 tablet, Rfl: 1    nitroglycerin (NITROSTAT) 0.4 MG SL  "tablet, Place 1 tablet under the tongue Every 5 (Five) Minutes As Needed for Chest Pain (Only if SBP Greater Than 100). Take no more than 3 doses in 15 minutes., Disp: 30 tablet, Rfl: 1    omeprazole (priLOSEC) 20 MG capsule, Take 1 capsule by mouth Daily., Disp: , Rfl:     ranolazine (RANEXA) 500 MG 12 hr tablet, Take 1 tablet by mouth Every 12 (Twelve) Hours., Disp: 180 tablet, Rfl: 3    rosuvastatin (CRESTOR) 10 MG tablet, Take 1 tablet by mouth Every Night., Disp: 90 tablet, Rfl: 3     Objective   Vital Signs:  /80   Pulse 87   Resp 18   Ht 182.9 cm (72\")   Wt 125 kg (275 lb)   SpO2 95%   BMI 37.30 kg/m²   Estimated body mass index is 37.3 kg/m² as calculated from the following:    Height as of this encounter: 182.9 cm (72\").    Weight as of this encounter: 125 kg (275 lb).      Constitutional:       Appearance: Healthy appearance. Not in distress.   Neck:      Vascular: JVD normal.   Pulmonary:      Effort: Pulmonary effort is normal.      Breath sounds: Normal breath sounds.   Cardiovascular:      Normal rate. Regular rhythm.      Murmurs: There is no murmur.      No gallop.  No click. No rub.   Edema:     Peripheral edema absent.   Abdominal:      General: There is no distension.      Palpations: Abdomen is soft.      Tenderness: There is no abdominal tenderness.   Skin:     General: Skin is warm and dry.   Neurological:      Mental Status: Alert and oriented to person, place and time.        Result Review :  The following data was reviewed by: Jesus Armstrong MD on 07/30/2024:  CMP   CMP          5/18/2024    10:12 7/10/2024    19:31 7/11/2024    04:31   CMP   Glucose 163     105  112    BUN 17     11  12    Creatinine 1.5     0.88  0.88    EGFR  93.7  93.7    Sodium 139     140  138    Potassium 4.1     4.3  4.1    Chloride 100     102  102    Calcium 9.9     9.7  9.4    Total Protein 8.0     6.8     Albumin 4.5     3.9     Globulin  2.9     Total Bilirubin 0.5     0.3     Alkaline Phosphatase 73 "     58     AST (SGOT) 25     15     ALT (SGPT) 35     25     Albumin/Globulin Ratio  1.3     BUN/Creatinine Ratio  12.5  13.6    Anion Gap 15     8.0  9.0       Details          This result is from an external source.             Lipid Panel   Lipid Panel          7/11/2024    04:31   Lipid Panel   Total Cholesterol 140    Triglycerides 93    HDL Cholesterol 36    VLDL Cholesterol 18    LDL Cholesterol  86    LDL/HDL Ratio 2.37             ECG 12 Lead    Date/Time: 7/30/2024 9:21 AM  Performed by: Jesus Armstrong MD    Authorized by: Jesus Armstrong MD  Comparison: compared with previous ECG from 7/10/2024  Rhythm: sinus rhythm  Rate: normal  BPM: 87  Conduction: incomplete right bundle branch block  QRS axis: indeterminate  Other findings: poor R wave progression  Other findings comments: Possible RVH    Clinical impression: abnormal EKG            Assessment and Plan   Diagnoses and all orders for this visit:    1. Coronary artery disease of native artery of native heart with stable angina pectoris (Primary)    2. Dyslipidemia    Other orders  -     ranolazine (RANEXA) 1000 MG 12 hr tablet; Take 1 tablet by mouth Every 12 (Twelve) Hours.  Dispense: 180 tablet; Refill: 3  -     ECG 12 Lead    - Increase Ranexa to 1000 mg twice daily  - Continue aspirin 81 mg daily and rosuvastatin 10 mg daily  - If continued exertional symptoms plan to add Toprol-XL 12.5 mg qpm and slowly titrate given prior low BP and dizziness with Imdur.  - If he subsequently continued to have refractory symptoms consider coronary angiography.    Follow Up   Return in about 4 weeks (around 8/27/2024).  Patient was given instructions and counseling regarding his condition or for health maintenance advice. Please see specific information pulled into the AVS if appropriate.       EMR Dragon/Transcription disclaimer: Much of this encounter note is an electronic transcription/translation of spoken language to printed text. The electronic translation  of spoken language may permit erroneous, or at times, nonsensical words or phrases to be inadvertently transcribed; although I have reviewed the note for such errors, some may still exist.

## 2024-08-20 ENCOUNTER — PREP FOR SURGERY (OUTPATIENT)
Dept: OTHER | Facility: HOSPITAL | Age: 69
End: 2024-08-20
Payer: MEDICARE

## 2024-08-20 ENCOUNTER — OFFICE VISIT (OUTPATIENT)
Dept: CARDIOLOGY | Facility: CLINIC | Age: 69
End: 2024-08-20
Payer: MEDICARE

## 2024-08-20 VITALS
OXYGEN SATURATION: 96 % | BODY MASS INDEX: 36.3 KG/M2 | RESPIRATION RATE: 18 BRPM | HEART RATE: 77 BPM | DIASTOLIC BLOOD PRESSURE: 78 MMHG | HEIGHT: 72 IN | SYSTOLIC BLOOD PRESSURE: 108 MMHG | WEIGHT: 268 LBS

## 2024-08-20 DIAGNOSIS — E78.5 DYSLIPIDEMIA: ICD-10-CM

## 2024-08-20 DIAGNOSIS — I25.118 CORONARY ARTERY DISEASE OF NATIVE ARTERY OF NATIVE HEART WITH STABLE ANGINA PECTORIS: Primary | ICD-10-CM

## 2024-08-20 PROCEDURE — 99214 OFFICE O/P EST MOD 30 MIN: CPT | Performed by: HOSPITALIST

## 2024-08-20 PROCEDURE — 93000 ELECTROCARDIOGRAM COMPLETE: CPT | Performed by: HOSPITALIST

## 2024-08-20 RX ORDER — METOPROLOL SUCCINATE 25 MG/1
12.5 TABLET, EXTENDED RELEASE ORAL EVERY EVENING
Qty: 30 TABLET | Refills: 5 | Status: SHIPPED | OUTPATIENT
Start: 2024-08-20

## 2024-08-20 RX ORDER — SODIUM CHLORIDE 0.9 % (FLUSH) 0.9 %
10 SYRINGE (ML) INJECTION EVERY 12 HOURS SCHEDULED
OUTPATIENT
Start: 2024-08-20

## 2024-08-20 RX ORDER — RANOLAZINE 500 MG/1
500 TABLET, EXTENDED RELEASE ORAL 2 TIMES DAILY
Qty: 60 TABLET | Refills: 11 | Status: SHIPPED | OUTPATIENT
Start: 2024-08-20

## 2024-08-20 RX ORDER — SODIUM CHLORIDE 0.9 % (FLUSH) 0.9 %
10 SYRINGE (ML) INJECTION AS NEEDED
OUTPATIENT
Start: 2024-08-20

## 2024-08-20 RX ORDER — SODIUM CHLORIDE 9 MG/ML
40 INJECTION, SOLUTION INTRAVENOUS AS NEEDED
OUTPATIENT
Start: 2024-08-20

## 2024-08-20 NOTE — H&P (VIEW-ONLY)
Reason For Visit:  CAD    Subjective        Jerome Parnell is a 69 y.o. male with the below pertinent PMH who presents for follow-up of CAD.    Jerome Parnell was most recently seen by me in clinic on 7/30/2024 for hospital follow-up at which time he was feeling much better since discharge with improvement in his dyspnea and quicker recovery time from activities.  Ranexa was increased to 1000 mg twice daily.    Today, the patient reports that he has felt worse since his last visit.  He has worsened fatigue and dyspnea.  He did not tolerate Ranexa 1000 mg as he states that caused him to feel more fatigued and he felt like his BP was lower, so he went back to 500 mg twice daily and has done a little better.  He has been having some occasional chest discomfort that resolves with rest.  He denies palpitations and recent lightheadedness.    ROS: Pertinent findings are included above.    Cardiac Studies  Echo 6/18/2024: LVEF 60 to 65%, normal LV size/thickness, normal diastolic function, normal RV size/function, normal biatrial size, no hemodynamically significant valve abnormalities.  Lexiscan myocardial perfusion SPECT 6/18/2024: Moderate risk study.  Mild severity stress perfusion defect that is small in size in the inferoseptal and inferior segments and partially reversible.     Pertinent PMH  CAD  Dyslipidemia  GERD  Obesity  Former tobacco use  Family history of ASCVD (father had CAD with CABG at age 63)    Pertinent past medical, surgical, family, and social history were reviewed.      Current Outpatient Medications:     aspirin 81 MG EC tablet, Take 1 tablet by mouth Daily., Disp: 90 tablet, Rfl: 1    nitroglycerin (NITROSTAT) 0.4 MG SL tablet, Place 1 tablet under the tongue Every 5 (Five) Minutes As Needed for Chest Pain (Only if SBP Greater Than 100). Take no more than 3 doses in 15 minutes., Disp: 30 tablet, Rfl: 1    omeprazole (priLOSEC) 20 MG capsule, Take 1 capsule by mouth Daily., Disp: , Rfl:     ranolazine  "(RANEXA) 1000 MG 12 hr tablet, Take 1 tablet by mouth Every 12 (Twelve) Hours., Disp: 180 tablet, Rfl: 3    rosuvastatin (CRESTOR) 10 MG tablet, Take 1 tablet by mouth Every Night., Disp: 90 tablet, Rfl: 3     Objective   Vital Signs:  /78   Pulse 77   Resp 18   Ht 182.9 cm (72\")   Wt 122 kg (268 lb)   SpO2 96%   BMI 36.35 kg/m²   Estimated body mass index is 36.35 kg/m² as calculated from the following:    Height as of this encounter: 182.9 cm (72\").    Weight as of this encounter: 122 kg (268 lb).      Constitutional:       Appearance: Healthy appearance. Not in distress.   Neck:      Vascular: JVD normal.   Pulmonary:      Effort: Pulmonary effort is normal.      Breath sounds: Normal breath sounds.   Cardiovascular:      Normal rate. Regular rhythm.      Murmurs: There is no murmur.      No gallop.  No click. No rub.   Edema:     Peripheral edema absent.   Abdominal:      General: There is no distension.      Palpations: Abdomen is soft.      Tenderness: There is no abdominal tenderness.   Skin:     General: Skin is warm and dry.   Neurological:      Mental Status: Alert and oriented to person, place and time.        Result Review :             ECG 12 Lead    Date/Time: 8/20/2024 9:31 AM  Performed by: Jesus Armstrong MD    Authorized by: Jesus Armstrong MD  Comparison: compared with previous ECG from 7/30/2024  Similar to previous ECG  Rhythm: sinus rhythm  Rate: normal  BPM: 77  Conduction: right bundle branch block and incomplete right bundle branch block  QRS axis: normal  Other findings comments: Possible RVH    Clinical impression: abnormal EKG            Assessment and Plan   Diagnoses and all orders for this visit:    1. Coronary artery disease of native artery of native heart with stable angina pectoris (Primary)    2. Dyslipidemia    Other orders  -     metoprolol succinate XL (TOPROL-XL) 25 MG 24 hr tablet; Take 0.5 tablets by mouth Every Evening.  Dispense: 30 tablet; Refill: 5  -     " ranolazine (Ranexa) 500 MG 12 hr tablet; Take 1 tablet by mouth 2 (Two) Times a Day.  Dispense: 60 tablet; Refill: 11  -     ECG 12 Lead      -Continues to have angina that appears to be worsening despite attempts at antianginal therapy.  Did not tolerate increased dose of Ranexa.  Previously did not tolerate Imdur due to orthostatic hypotension.  I suspect that he would not tolerate calcium channel blocker.  I am going to try a very low-dose of Toprol-XL 12.5 mg qpm although at this juncture given his worsening symptoms I think the best course of action will be to proceed with coronary angiography.  The patient agrees and would like to schedule a cath.  Risks/benefits were discussed with the patient.  I will arrange this with interventional cardiology.  - Start Toprol-XL 12.5 mg qpm  - Continue Ranexa 500 mg twice daily  - Continue rosuvastatin 10 mg daily  - Continue aspirin 81 mg daily    Follow Up   Return in about 7 weeks (around 10/8/2024).  Patient was given instructions and counseling regarding his condition or for health maintenance advice. Please see specific information pulled into the AVS if appropriate.       EMR Dragon/Transcription disclaimer: Much of this encounter note is an electronic transcription/translation of spoken language to printed text. The electronic translation of spoken language may permit erroneous, or at times, nonsensical words or phrases to be inadvertently transcribed; although I have reviewed the note for such errors, some may still exist.

## 2024-08-21 PROBLEM — I25.118 CORONARY ARTERY DISEASE OF NATIVE ARTERY OF NATIVE HEART WITH STABLE ANGINA PECTORIS: Status: ACTIVE | Noted: 2024-08-20

## 2024-08-28 ENCOUNTER — HOSPITAL ENCOUNTER (OUTPATIENT)
Facility: HOSPITAL | Age: 69
Discharge: HOME | End: 2024-08-29
Attending: INTERNAL MEDICINE | Admitting: INTERNAL MEDICINE
Payer: MEDICARE

## 2024-08-28 DIAGNOSIS — I25.118 CORONARY ARTERY DISEASE OF NATIVE ARTERY OF NATIVE HEART WITH STABLE ANGINA PECTORIS: ICD-10-CM

## 2024-08-28 PROBLEM — I25.10 CAD (CORONARY ARTERY DISEASE), NATIVE CORONARY ARTERY: Status: ACTIVE | Noted: 2024-08-28

## 2024-08-28 LAB
ANION GAP SERPL CALCULATED.3IONS-SCNC: 12 MMOL/L (ref 5–15)
BUN SERPL-MCNC: 20 MG/DL (ref 8–23)
BUN/CREAT SERPL: 18.7 (ref 7–25)
CALCIUM SPEC-SCNC: 9.9 MG/DL (ref 8.6–10.5)
CHLORIDE SERPL-SCNC: 101 MMOL/L (ref 98–107)
CO2 SERPL-SCNC: 25 MMOL/L (ref 22–29)
CREAT SERPL-MCNC: 1.07 MG/DL (ref 0.76–1.27)
DEPRECATED RDW RBC AUTO: 43.3 FL (ref 37–54)
EGFRCR SERPLBLD CKD-EPI 2021: 75.1 ML/MIN/1.73
ERYTHROCYTE [DISTWIDTH] IN BLOOD BY AUTOMATED COUNT: 13.2 % (ref 12.3–15.4)
GLUCOSE BLDC GLUCOMTR-MCNC: 103 MG/DL (ref 70–130)
GLUCOSE SERPL-MCNC: 123 MG/DL (ref 65–99)
HCT VFR BLD AUTO: 49.5 % (ref 37.5–51)
HGB BLD-MCNC: 16.7 G/DL (ref 13–17.7)
MCH RBC QN AUTO: 30.4 PG (ref 26.6–33)
MCHC RBC AUTO-ENTMCNC: 33.7 G/DL (ref 31.5–35.7)
MCV RBC AUTO: 90.2 FL (ref 79–97)
PLATELET # BLD AUTO: 217 10*3/MM3 (ref 140–450)
PMV BLD AUTO: 10.6 FL (ref 6–12)
POTASSIUM SERPL-SCNC: 4.3 MMOL/L (ref 3.5–5.2)
RBC # BLD AUTO: 5.49 10*6/MM3 (ref 4.14–5.8)
SODIUM SERPL-SCNC: 138 MMOL/L (ref 136–145)
WBC NRBC COR # BLD AUTO: 7.35 10*3/MM3 (ref 3.4–10.8)

## 2024-08-28 PROCEDURE — C1769 GUIDE WIRE: HCPCS | Performed by: INTERNAL MEDICINE

## 2024-08-28 PROCEDURE — 92920 PRQ TRLUML C ANGIOP 1ART&/BR: CPT | Performed by: INTERNAL MEDICINE

## 2024-08-28 PROCEDURE — 93458 L HRT ARTERY/VENTRICLE ANGIO: CPT | Performed by: INTERNAL MEDICINE

## 2024-08-28 PROCEDURE — C1725 CATH, TRANSLUMIN NON-LASER: HCPCS | Performed by: INTERNAL MEDICINE

## 2024-08-28 PROCEDURE — 25010000002 HEPARIN (PORCINE) 2000-0.9 UNIT/L-% SOLUTION: Performed by: INTERNAL MEDICINE

## 2024-08-28 PROCEDURE — A9270 NON-COVERED ITEM OR SERVICE: HCPCS | Performed by: INTERNAL MEDICINE

## 2024-08-28 PROCEDURE — 92928 PRQ TCAT PLMT NTRAC ST 1 LES: CPT | Performed by: INTERNAL MEDICINE

## 2024-08-28 PROCEDURE — 25010000002 HEPARIN (PORCINE) PER 1000 UNITS: Performed by: INTERNAL MEDICINE

## 2024-08-28 PROCEDURE — 85027 COMPLETE CBC AUTOMATED: CPT | Performed by: HOSPITALIST

## 2024-08-28 PROCEDURE — 80048 BASIC METABOLIC PNL TOTAL CA: CPT | Performed by: HOSPITALIST

## 2024-08-28 PROCEDURE — 93010 ELECTROCARDIOGRAM REPORT: CPT | Performed by: INTERNAL MEDICINE

## 2024-08-28 PROCEDURE — 63710000001 ROSUVASTATIN 10 MG TABLET: Performed by: INTERNAL MEDICINE

## 2024-08-28 PROCEDURE — C1761: HCPCS | Performed by: INTERNAL MEDICINE

## 2024-08-28 PROCEDURE — 99153 MOD SED SAME PHYS/QHP EA: CPT | Performed by: INTERNAL MEDICINE

## 2024-08-28 PROCEDURE — 85347 COAGULATION TIME ACTIVATED: CPT

## 2024-08-28 PROCEDURE — 25010000002 HEPARIN (PORCINE) 1000-0.9 UT/500ML-% SOLUTION: Performed by: INTERNAL MEDICINE

## 2024-08-28 PROCEDURE — 99152 MOD SED SAME PHYS/QHP 5/>YRS: CPT | Performed by: INTERNAL MEDICINE

## 2024-08-28 PROCEDURE — 25010000002 FENTANYL CITRATE (PF) 50 MCG/ML SOLUTION: Performed by: INTERNAL MEDICINE

## 2024-08-28 PROCEDURE — 63710000001 METOPROLOL SUCCINATE XL 25 MG TABLET SUSTAINED-RELEASE 24 HOUR: Performed by: INTERNAL MEDICINE

## 2024-08-28 PROCEDURE — C1887 CATHETER, GUIDING: HCPCS | Performed by: INTERNAL MEDICINE

## 2024-08-28 PROCEDURE — 82948 REAGENT STRIP/BLOOD GLUCOSE: CPT

## 2024-08-28 PROCEDURE — C1894 INTRO/SHEATH, NON-LASER: HCPCS | Performed by: INTERNAL MEDICINE

## 2024-08-28 PROCEDURE — 25810000003 SODIUM CHLORIDE 0.9 % SOLUTION: Performed by: HOSPITALIST

## 2024-08-28 PROCEDURE — 63710000001 RANOLAZINE 500 MG TABLET SUSTAINED-RELEASE 12 HOUR: Performed by: INTERNAL MEDICINE

## 2024-08-28 PROCEDURE — 93005 ELECTROCARDIOGRAM TRACING: CPT | Performed by: INTERNAL MEDICINE

## 2024-08-28 PROCEDURE — 25510000001 IOPAMIDOL PER 1 ML: Performed by: INTERNAL MEDICINE

## 2024-08-28 PROCEDURE — 25810000003 SODIUM CHLORIDE 0.9 % SOLUTION: Performed by: INTERNAL MEDICINE

## 2024-08-28 PROCEDURE — 25010000002 MIDAZOLAM HCL (PF) 5 MG/5ML SOLUTION: Performed by: INTERNAL MEDICINE

## 2024-08-28 PROCEDURE — 25010000002 BIVALIRUDIN TRIFLUOROACETATE 250 MG RECONSTITUTED SOLUTION 1 EACH VIAL: Performed by: INTERNAL MEDICINE

## 2024-08-28 RX ORDER — MIDAZOLAM HYDROCHLORIDE 5 MG/5ML
INJECTION, SOLUTION INTRAMUSCULAR; INTRAVENOUS
Status: DISCONTINUED | OUTPATIENT
Start: 2024-08-28 | End: 2024-08-28 | Stop reason: HOSPADM

## 2024-08-28 RX ORDER — VERAPAMIL HYDROCHLORIDE 2.5 MG/ML
INJECTION, SOLUTION INTRAVENOUS
Status: DISCONTINUED | OUTPATIENT
Start: 2024-08-28 | End: 2024-08-28 | Stop reason: HOSPADM

## 2024-08-28 RX ORDER — FENTANYL CITRATE 50 UG/ML
INJECTION, SOLUTION INTRAMUSCULAR; INTRAVENOUS
Status: DISCONTINUED | OUTPATIENT
Start: 2024-08-28 | End: 2024-08-28 | Stop reason: HOSPADM

## 2024-08-28 RX ORDER — LIDOCAINE HYDROCHLORIDE 20 MG/ML
INJECTION, SOLUTION INFILTRATION; PERINEURAL
Status: DISCONTINUED | OUTPATIENT
Start: 2024-08-28 | End: 2024-08-28 | Stop reason: HOSPADM

## 2024-08-28 RX ORDER — IOPAMIDOL 755 MG/ML
INJECTION, SOLUTION INTRAVASCULAR
Status: DISCONTINUED | OUTPATIENT
Start: 2024-08-28 | End: 2024-08-28 | Stop reason: HOSPADM

## 2024-08-28 RX ORDER — SODIUM CHLORIDE 0.9 % (FLUSH) 0.9 %
10 SYRINGE (ML) INJECTION EVERY 12 HOURS SCHEDULED
Status: DISCONTINUED | OUTPATIENT
Start: 2024-08-28 | End: 2024-08-28 | Stop reason: HOSPADM

## 2024-08-28 RX ORDER — ASPIRIN 81 MG/1
324 TABLET, CHEWABLE ORAL ONCE
Status: COMPLETED | OUTPATIENT
Start: 2024-08-28 | End: 2024-08-28

## 2024-08-28 RX ORDER — PANTOPRAZOLE SODIUM 40 MG/1
40 TABLET, DELAYED RELEASE ORAL
Status: DISCONTINUED | OUTPATIENT
Start: 2024-08-29 | End: 2024-08-29 | Stop reason: HOSPADM

## 2024-08-28 RX ORDER — HEPARIN SODIUM 200 [USP'U]/100ML
INJECTION, SOLUTION INTRAVENOUS
Status: DISCONTINUED | OUTPATIENT
Start: 2024-08-28 | End: 2024-08-28 | Stop reason: HOSPADM

## 2024-08-28 RX ORDER — ACETAMINOPHEN 325 MG/1
650 TABLET ORAL EVERY 4 HOURS PRN
Status: DISCONTINUED | OUTPATIENT
Start: 2024-08-28 | End: 2024-08-29 | Stop reason: HOSPADM

## 2024-08-28 RX ORDER — SODIUM CHLORIDE 9 MG/ML
100 INJECTION, SOLUTION INTRAVENOUS CONTINUOUS
Status: DISPENSED | OUTPATIENT
Start: 2024-08-28 | End: 2024-08-28

## 2024-08-28 RX ORDER — METOPROLOL SUCCINATE 25 MG/1
12.5 TABLET, EXTENDED RELEASE ORAL EVERY EVENING
Status: DISCONTINUED | OUTPATIENT
Start: 2024-08-28 | End: 2024-08-29 | Stop reason: HOSPADM

## 2024-08-28 RX ORDER — RANOLAZINE 500 MG/1
500 TABLET, EXTENDED RELEASE ORAL 2 TIMES DAILY
Status: DISCONTINUED | OUTPATIENT
Start: 2024-08-28 | End: 2024-08-29 | Stop reason: HOSPADM

## 2024-08-28 RX ORDER — SODIUM CHLORIDE 0.9 % (FLUSH) 0.9 %
10 SYRINGE (ML) INJECTION AS NEEDED
Status: DISCONTINUED | OUTPATIENT
Start: 2024-08-28 | End: 2024-08-28 | Stop reason: HOSPADM

## 2024-08-28 RX ORDER — ASPIRIN 81 MG/1
TABLET, CHEWABLE ORAL
Status: COMPLETED
Start: 2024-08-28 | End: 2024-08-28

## 2024-08-28 RX ORDER — NITROGLYCERIN 0.4 MG/1
0.4 TABLET SUBLINGUAL
Status: DISCONTINUED | OUTPATIENT
Start: 2024-08-28 | End: 2024-08-29 | Stop reason: HOSPADM

## 2024-08-28 RX ORDER — SODIUM CHLORIDE 9 MG/ML
40 INJECTION, SOLUTION INTRAVENOUS AS NEEDED
Status: DISCONTINUED | OUTPATIENT
Start: 2024-08-28 | End: 2024-08-28 | Stop reason: HOSPADM

## 2024-08-28 RX ORDER — ASPIRIN 81 MG/1
81 TABLET ORAL DAILY
Status: DISCONTINUED | OUTPATIENT
Start: 2024-08-29 | End: 2024-08-29 | Stop reason: HOSPADM

## 2024-08-28 RX ORDER — ROSUVASTATIN CALCIUM 10 MG/1
10 TABLET, COATED ORAL NIGHTLY
Status: DISCONTINUED | OUTPATIENT
Start: 2024-08-28 | End: 2024-08-29

## 2024-08-28 RX ORDER — HEPARIN SODIUM 1000 [USP'U]/ML
INJECTION, SOLUTION INTRAVENOUS; SUBCUTANEOUS
Status: DISCONTINUED | OUTPATIENT
Start: 2024-08-28 | End: 2024-08-28 | Stop reason: HOSPADM

## 2024-08-28 RX ADMIN — SODIUM CHLORIDE 100 ML/HR: 9 INJECTION, SOLUTION INTRAVENOUS at 12:36

## 2024-08-28 RX ADMIN — ROSUVASTATIN CALCIUM 10 MG: 10 TABLET, FILM COATED ORAL at 22:01

## 2024-08-28 RX ADMIN — ASPIRIN 243 MG: 81 TABLET, CHEWABLE ORAL at 08:17

## 2024-08-28 RX ADMIN — SODIUM CHLORIDE 300 ML: 9 INJECTION, SOLUTION INTRAVENOUS at 08:18

## 2024-08-28 RX ADMIN — RANOLAZINE 500 MG: 500 TABLET, FILM COATED, EXTENDED RELEASE ORAL at 22:01

## 2024-08-28 RX ADMIN — METOPROLOL SUCCINATE 12.5 MG: 25 TABLET, EXTENDED RELEASE ORAL at 18:08

## 2024-08-28 NOTE — Clinical Note
First balloon inflation max pressure = 10 tara. First balloon inflation duration = 15 seconds. Second inflation of balloon - Max pressure = 10 tara. 2nd Inflation of balloon - Duration = 20 seconds. Third inflation of balloon - Max pressure = 10 tara. 3rd Inflation of balloon - Duration = 15 seconds. Fourth inflation of balloon - Max pressure = 15 tara. 4th Inflation of balloon - Duration = 16 seconds.

## 2024-08-28 NOTE — Clinical Note
First balloon inflation max pressure = 12 tara. First balloon inflation duration = 10 seconds. Second inflation of balloon - Max pressure = 12 tara. 2nd Inflation of balloon - Duration = 10 seconds. Third inflation of balloon - Max pressure = 20 tara. 3rd Inflation of balloon - Duration = 50 seconds.

## 2024-08-28 NOTE — Clinical Note
Balloon inserted in circumflex. PROCEDURE: US right lower extremity venous.



TECHNIQUE: Multiple real-time grayscale images were obtained over

the right lower extremity in various projections. Additional

duplex Doppler and color Doppler images were also obtained.



INDICATION: Right-sided posterior calf pain.



FINDINGS: There is no evidence of right lower extremity DVT.

Right lower extremity deep venous system shows normal

compressibility with normal response to augmentation and

Valsalva. No fluid collection or mass is seen.



IMPRESSION: No evidence of right lower extremity DVT.



Dictated by: 



  Dictated on workstation # [id number]

## 2024-08-28 NOTE — Clinical Note
First balloon inflation max pressure = 20 tara. First balloon inflation duration = 40 seconds. Second inflation of balloon - Max pressure = 20 tara. 2nd Inflation of balloon - Duration = 45 seconds. Third inflation of balloon - Max pressure = 20 tara. 3rd Inflation of balloon - Duration = 25 seconds.

## 2024-08-29 ENCOUNTER — PREP FOR SURGERY (OUTPATIENT)
Dept: OTHER | Facility: HOSPITAL | Age: 69
End: 2024-08-29
Payer: MEDICARE

## 2024-08-29 VITALS
HEIGHT: 72 IN | RESPIRATION RATE: 18 BRPM | SYSTOLIC BLOOD PRESSURE: 121 MMHG | BODY MASS INDEX: 36.65 KG/M2 | HEART RATE: 82 BPM | OXYGEN SATURATION: 97 % | WEIGHT: 270.6 LBS | TEMPERATURE: 98 F | DIASTOLIC BLOOD PRESSURE: 74 MMHG

## 2024-08-29 DIAGNOSIS — I25.10: Primary | ICD-10-CM

## 2024-08-29 DIAGNOSIS — I25.118 CORONARY ARTERY DISEASE OF NATIVE ARTERY OF NATIVE HEART WITH STABLE ANGINA PECTORIS: ICD-10-CM

## 2024-08-29 LAB
ANION GAP SERPL CALCULATED.3IONS-SCNC: 8 MMOL/L (ref 5–15)
BUN SERPL-MCNC: 17 MG/DL (ref 8–23)
BUN/CREAT SERPL: 15.2 (ref 7–25)
CALCIUM SPEC-SCNC: 9.1 MG/DL (ref 8.6–10.5)
CHLORIDE SERPL-SCNC: 103 MMOL/L (ref 98–107)
CO2 SERPL-SCNC: 26 MMOL/L (ref 22–29)
CREAT SERPL-MCNC: 1.12 MG/DL (ref 0.76–1.27)
DEPRECATED RDW RBC AUTO: 42.9 FL (ref 37–54)
EGFRCR SERPLBLD CKD-EPI 2021: 71.1 ML/MIN/1.73
ERYTHROCYTE [DISTWIDTH] IN BLOOD BY AUTOMATED COUNT: 13.2 % (ref 12.3–15.4)
GLUCOSE SERPL-MCNC: 113 MG/DL (ref 65–99)
HCT VFR BLD AUTO: 42.6 % (ref 37.5–51)
HGB BLD-MCNC: 14.5 G/DL (ref 13–17.7)
MCH RBC QN AUTO: 30.5 PG (ref 26.6–33)
MCHC RBC AUTO-ENTMCNC: 34 G/DL (ref 31.5–35.7)
MCV RBC AUTO: 89.5 FL (ref 79–97)
PLATELET # BLD AUTO: 171 10*3/MM3 (ref 140–450)
PMV BLD AUTO: 10.5 FL (ref 6–12)
POTASSIUM SERPL-SCNC: 4 MMOL/L (ref 3.5–5.2)
QT INTERVAL: 422 MS
QTC INTERVAL: 458 MS
RBC # BLD AUTO: 4.76 10*6/MM3 (ref 4.14–5.8)
SODIUM SERPL-SCNC: 137 MMOL/L (ref 136–145)
WBC NRBC COR # BLD AUTO: 6.97 10*3/MM3 (ref 3.4–10.8)

## 2024-08-29 PROCEDURE — A9270 NON-COVERED ITEM OR SERVICE: HCPCS | Performed by: INTERNAL MEDICINE

## 2024-08-29 PROCEDURE — 80048 BASIC METABOLIC PNL TOTAL CA: CPT | Performed by: INTERNAL MEDICINE

## 2024-08-29 PROCEDURE — 63710000001 RANOLAZINE 500 MG TABLET SUSTAINED-RELEASE 12 HOUR: Performed by: INTERNAL MEDICINE

## 2024-08-29 PROCEDURE — 63710000001 ASPIRIN 81 MG TABLET DELAYED-RELEASE: Performed by: INTERNAL MEDICINE

## 2024-08-29 PROCEDURE — 99214 OFFICE O/P EST MOD 30 MIN: CPT | Performed by: NURSE PRACTITIONER

## 2024-08-29 PROCEDURE — 85027 COMPLETE CBC AUTOMATED: CPT | Performed by: INTERNAL MEDICINE

## 2024-08-29 PROCEDURE — 63710000001 PANTOPRAZOLE 40 MG TABLET DELAYED-RELEASE: Performed by: INTERNAL MEDICINE

## 2024-08-29 PROCEDURE — A9270 NON-COVERED ITEM OR SERVICE: HCPCS | Performed by: NURSE PRACTITIONER

## 2024-08-29 PROCEDURE — 63710000001 CLOPIDOGREL 75 MG TABLET: Performed by: NURSE PRACTITIONER

## 2024-08-29 RX ORDER — CLOPIDOGREL BISULFATE 75 MG/1
75 TABLET ORAL DAILY
Status: DISCONTINUED | OUTPATIENT
Start: 2024-08-29 | End: 2024-08-29 | Stop reason: HOSPADM

## 2024-08-29 RX ORDER — CLOPIDOGREL BISULFATE 75 MG/1
75 TABLET ORAL DAILY
Qty: 30 TABLET | Refills: 11 | Status: SHIPPED | OUTPATIENT
Start: 2024-08-29

## 2024-08-29 RX ORDER — ROSUVASTATIN CALCIUM 20 MG/1
20 TABLET, COATED ORAL NIGHTLY
Status: DISCONTINUED | OUTPATIENT
Start: 2024-08-29 | End: 2024-08-29 | Stop reason: HOSPADM

## 2024-08-29 RX ORDER — ROSUVASTATIN CALCIUM 20 MG/1
20 TABLET, COATED ORAL NIGHTLY
Qty: 90 TABLET | Refills: 3 | Status: SHIPPED | OUTPATIENT
Start: 2024-08-29

## 2024-08-29 RX ORDER — SODIUM CHLORIDE 9 MG/ML
40 INJECTION, SOLUTION INTRAVENOUS AS NEEDED
OUTPATIENT
Start: 2024-08-29

## 2024-08-29 RX ORDER — SODIUM CHLORIDE 0.9 % (FLUSH) 0.9 %
3 SYRINGE (ML) INJECTION EVERY 12 HOURS SCHEDULED
OUTPATIENT
Start: 2024-08-29

## 2024-08-29 RX ORDER — SODIUM CHLORIDE 0.9 % (FLUSH) 0.9 %
10 SYRINGE (ML) INJECTION AS NEEDED
OUTPATIENT
Start: 2024-08-29

## 2024-08-29 RX ORDER — ASPIRIN 81 MG/1
324 TABLET, CHEWABLE ORAL ONCE
OUTPATIENT
Start: 2024-08-29 | End: 2024-08-29

## 2024-08-29 RX ORDER — ASPIRIN 81 MG/1
81 TABLET ORAL DAILY
OUTPATIENT
Start: 2024-08-30

## 2024-08-29 RX ADMIN — PANTOPRAZOLE SODIUM 40 MG: 40 TABLET, DELAYED RELEASE ORAL at 06:30

## 2024-08-29 RX ADMIN — RANOLAZINE 500 MG: 500 TABLET, FILM COATED, EXTENDED RELEASE ORAL at 09:34

## 2024-08-29 RX ADMIN — ASPIRIN 81 MG: 81 TABLET, COATED ORAL at 09:34

## 2024-08-29 RX ADMIN — CLOPIDOGREL BISULFATE 75 MG: 75 TABLET, FILM COATED ORAL at 09:34

## 2024-08-29 NOTE — PLAN OF CARE
Goal Outcome Evaluation:      No changes overnight. VSS. Sinus rony on tele. Pt refused brilinta. Education provided. Patient stated he would start taking in the morning. No c/o pain. Safety maintained.

## 2024-08-29 NOTE — DISCHARGE SUMMARY
"[facility] DISCHARGE    Date of Discharge:  8/29/2024    Discharge Diagnosis:     -Unstable angina (patient with previous inferolateral perfusion defect on stress test, trialed on antianginal therapy now still having rest angina despite maximally tolerated antianginal regimen) s/p unsuccessful PCI to mid cx/OM 2     Presenting Problem/History of Present Illness  Coronary artery disease of native artery of native heart with stable angina pectoris [I25.118]  CAD (coronary artery disease), native coronary artery [I25.10]    The patient is followed by Dr. Armstrong as his primary cardiologist.  His primary complaints are fatigue and exertional dyspnea.  He had a Lexiscan for ischemic evaluation in June 2024 that showed an inferolateral perfusion deficit.  He has been treated with antianginal medication.  He did not tolerate Imdur due to orthostatic hypotension.  He also did not tolerate max dose Ranexa but has been tolerating low-dose Ranexa and low-dose Toprol-XL.  However, despite antianginal therapy he has continued to have symptoms consistent with unstable angina.  Therefore, he was scheduled for cardiac catheterization with Dr. Andrews.      Hospital Course  Patient is a 69 y.o. male presented yesterday for cardiac catheterization with Dr. Andrews.  He was found to have single-vessel coronary artery disease in the mid circumflex/OM 2, 90% stenosis.  See full details below, but ultimately PCI was unsuccessful.  He did receive Brilinta and subsequently developed significant side effects from this.  He tells me this causes him to feel acutely short of breath and like he is having a panic attack.  Episodes will last for several minutes.  This morning he states he believes this is starting to wear off.  He refused his evening dose of Brilinta last night.  He has not had any episodes since 4:30 AM.  He states \"I feel back to baseline.\"  He states he still gets short of breath with exertion, for example " when walking to the bathroom but he otherwise denies any dyspnea.  He also denies any chest pain.  He is hemodynamically stable.  Given side effects he is having with Brilinta we will transition him to Plavix 75 mg daily this morning.  I have uptitrated his rosuvastatin to 20 mg nightly in light of his coronary disease.  He will continue his guideline directed medical therapy otherwise.  He has not been referred for cardiac rehab as PCI was unsuccessful and plans are for him to return in 1 to 2 weeks for repeat attempted PCI to this lesion via femoral approach.  He is felt to be stable for discharge.    Procedures Performed  Procedure(s):  Cardiac Catheterization/Vascular Study  08/28 7867 Note By: Yuri Andrews MD    Impression:  1. Single vessel coronary artery disease.  2. Unsuccessful percutaneous coronary intervention the mid circumflex/OM 2, with delivery and expansion of 2.0 semicompliant balloon as well as 2.0 and 2.25 NC balloons, none of which could dilate the most critical area of calcified critical stenosis at the ostium of OM 2. Further efforts at delivering a shockwave balloon or a larger NC balloons were unsuccessful, despite deep GuideLiner placement.    3. LVEF 45% with inferolateral hypokinesis; normal EDP    Plan:   1. TR band off in 2 hours, will keep overnight for observation and IV hydration  2. After further discussion with the patient, he is amenable to plan to return to the Cath Lab in 1-2 weeks for PCI to this lesion. This will be attempted via femoral approach and likely using 7 Malay AL-1 or AL 2 guiding catheter for better support, and either rotational atherectomy for intravascular lithotripsy to aid in lesion expansion.   3. In the meantime, we will keep the plate patient on dual antiplatelet therapy with aspirin and Brilinta, as well as current antianginal regimen and high intensity statin.   4. IV hydration with normal saline at 100 mL/h x 5 hours  5. Plan for discharge home  tomorrow    Yuri Andrews MD     Condition on Discharge:  Stable     Physical Exam at Discharge  General: alert and oriented  Card: RRR; NSR 60s-70s on tele with occasional PVCs  Resp: CTA  Extrem: no edema, right radial access site CDI, no swelling, warmth, hematoma, drainage or redness, good cap refill, radial pulse 3+    Vital Signs  Temp:  [97.7 °F (36.5 °C)-98 °F (36.7 °C)] 98 °F (36.7 °C)  Heart Rate:  [60-82] 82  Resp:  [14-19] 18  BP: (103-134)/() 121/74      Discharge Disposition  Home or Self Care    Discharge Medications     Discharge Medications        New Medications        Instructions Start Date   clopidogrel 75 MG tablet  Commonly known as: PLAVIX   75 mg, Oral, Daily             Changes to Medications        Instructions Start Date   rosuvastatin 20 MG tablet  Commonly known as: CRESTOR  What changed:   medication strength  how much to take   20 mg, Oral, Nightly             Continue These Medications        Instructions Start Date   aspirin 81 MG EC tablet   81 mg, Oral, Daily      metoprolol succinate XL 25 MG 24 hr tablet  Commonly known as: TOPROL-XL   12.5 mg, Oral, Every Evening      nitroglycerin 0.4 MG SL tablet  Commonly known as: NITROSTAT   0.4 mg, Sublingual, Every 5 Minutes PRN, Take no more than 3 doses in 15 minutes.      omeprazole 20 MG capsule  Commonly known as: priLOSEC   20 mg, Oral, Daily, OTC      ranolazine 500 MG 12 hr tablet  Commonly known as: Ranexa   500 mg, Oral, 2 Times Daily               Discharge Diet: heart healthy     Activity at Discharge:     No heavy lifting for 5-7 days greater than 10 pounds.  No heavy or strenuous pushing or pulling.  No tub baths, hot tubs, swimming pools, or submerging site underwater for 1 week.  Wash site daily with antibacterial soap and water. Rinse and keep clean and dry.  No lotions, powders, or topical ointments to site. Keep open to air and dry.  Call our office with any concerns or if you notice redness, swelling,  drainage, warmth, or pain at the site.     Follow-up Appointments    PCP 4 weeks  We will contact the pt to schedule PCI with Dr. Andrews in 1-2 weeks    Future Appointments   Date Time Provider Department Center   10/8/2024 11:00 AM Jesus Armstrong MD [facility]       Electronically signed by ANDRES Berrios, 08/29/24, 8:52 AM CDT.     Time:45 minutes

## 2024-08-30 PROBLEM — I25.10: Status: ACTIVE | Noted: 2024-08-29

## 2024-09-05 LAB — ACT BLD: 379 SECONDS (ref 82–152)

## 2024-09-06 ENCOUNTER — HOSPITAL ENCOUNTER (OUTPATIENT)
Dept: CARDIOLOGY | Facility: HOSPITAL | Age: 69
Setting detail: HOSPITAL OUTPATIENT SURGERY
Discharge: HOME | End: 2024-09-06
Payer: MEDICARE

## 2024-09-06 LAB
ANION GAP SERPL CALCULATED.3IONS-SCNC: 11 MMOL/L (ref 5–15)
BUN SERPL-MCNC: 13 MG/DL (ref 8–23)
BUN/CREAT SERPL: 11.9 (ref 7–25)
CALCIUM SPEC-SCNC: 9.8 MG/DL (ref 8.6–10.5)
CHLORIDE SERPL-SCNC: 99 MMOL/L (ref 98–107)
CO2 SERPL-SCNC: 28 MMOL/L (ref 22–29)
CREAT SERPL-MCNC: 1.09 MG/DL (ref 0.76–1.27)
DEPRECATED RDW RBC AUTO: 42.4 FL (ref 37–54)
EGFRCR SERPLBLD CKD-EPI 2021: 73.5 ML/MIN/1.73
ERYTHROCYTE [DISTWIDTH] IN BLOOD BY AUTOMATED COUNT: 13 % (ref 12.3–15.4)
GLUCOSE SERPL-MCNC: 110 MG/DL (ref 65–99)
HCT VFR BLD AUTO: 48.7 % (ref 37.5–51)
HGB BLD-MCNC: 16.4 G/DL (ref 13–17.7)
MCH RBC QN AUTO: 29.8 PG (ref 26.6–33)
MCHC RBC AUTO-ENTMCNC: 33.7 G/DL (ref 31.5–35.7)
MCV RBC AUTO: 88.4 FL (ref 79–97)
PLATELET # BLD AUTO: 214 10*3/MM3 (ref 140–450)
PMV BLD AUTO: 10.1 FL (ref 6–12)
POTASSIUM SERPL-SCNC: 4.1 MMOL/L (ref 3.5–5.2)
RBC # BLD AUTO: 5.51 10*6/MM3 (ref 4.14–5.8)
SODIUM SERPL-SCNC: 138 MMOL/L (ref 136–145)
WBC NRBC COR # BLD AUTO: 7.03 10*3/MM3 (ref 3.4–10.8)

## 2024-09-06 PROCEDURE — 80048 BASIC METABOLIC PNL TOTAL CA: CPT | Performed by: INTERNAL MEDICINE

## 2024-09-06 PROCEDURE — 85027 COMPLETE CBC AUTOMATED: CPT | Performed by: INTERNAL MEDICINE

## 2024-09-06 NOTE — NURSING NOTE
Pt arrived to the [facility] for pre work testing, blood drawn and sent to lab. Pt has PCI scheduled for 9/11/2024. Questions answered.   Pt d/c in no distress.

## 2024-09-11 ENCOUNTER — HOSPITAL ENCOUNTER (OUTPATIENT)
Facility: HOSPITAL | Age: 69
Discharge: HOME | End: 2024-09-12
Attending: INTERNAL MEDICINE | Admitting: INTERNAL MEDICINE
Payer: MEDICARE

## 2024-09-11 DIAGNOSIS — I25.118 CORONARY ARTERY DISEASE OF NATIVE ARTERY OF NATIVE HEART WITH STABLE ANGINA PECTORIS: ICD-10-CM

## 2024-09-11 DIAGNOSIS — I25.10: ICD-10-CM

## 2024-09-11 PROBLEM — Z95.5 S/P DRUG ELUTING CORONARY STENT PLACEMENT: Status: ACTIVE | Noted: 2024-09-11

## 2024-09-11 PROCEDURE — 25010000002 FENTANYL CITRATE (PF) 50 MCG/ML SOLUTION: Performed by: INTERNAL MEDICINE

## 2024-09-11 PROCEDURE — C1769 GUIDE WIRE: HCPCS | Performed by: INTERNAL MEDICINE

## 2024-09-11 PROCEDURE — C1887 CATHETER, GUIDING: HCPCS | Performed by: INTERNAL MEDICINE

## 2024-09-11 PROCEDURE — C1725 CATH, TRANSLUMIN NON-LASER: HCPCS | Performed by: INTERNAL MEDICINE

## 2024-09-11 PROCEDURE — 99152 MOD SED SAME PHYS/QHP 5/>YRS: CPT | Performed by: INTERNAL MEDICINE

## 2024-09-11 PROCEDURE — C1760 CLOSURE DEV, VASC: HCPCS | Performed by: INTERNAL MEDICINE

## 2024-09-11 PROCEDURE — 63710000001 METOPROLOL SUCCINATE XL 25 MG TABLET SUSTAINED-RELEASE 24 HOUR: Performed by: INTERNAL MEDICINE

## 2024-09-11 PROCEDURE — 25010000002 MIDAZOLAM HCL (PF) 5 MG/5ML SOLUTION: Performed by: INTERNAL MEDICINE

## 2024-09-11 PROCEDURE — A9270 NON-COVERED ITEM OR SERVICE: HCPCS | Performed by: INTERNAL MEDICINE

## 2024-09-11 PROCEDURE — 25510000001 IOPAMIDOL PER 1 ML: Performed by: INTERNAL MEDICINE

## 2024-09-11 PROCEDURE — C1894 INTRO/SHEATH, NON-LASER: HCPCS | Performed by: INTERNAL MEDICINE

## 2024-09-11 PROCEDURE — 25810000003 SODIUM CHLORIDE 0.9 % SOLUTION: Performed by: INTERNAL MEDICINE

## 2024-09-11 PROCEDURE — 92928 PRQ TCAT PLMT NTRAC ST 1 LES: CPT | Performed by: INTERNAL MEDICINE

## 2024-09-11 PROCEDURE — 85347 COAGULATION TIME ACTIVATED: CPT

## 2024-09-11 PROCEDURE — 99153 MOD SED SAME PHYS/QHP EA: CPT | Performed by: INTERNAL MEDICINE

## 2024-09-11 PROCEDURE — C1874 STENT, COATED/COV W/DEL SYS: HCPCS | Performed by: INTERNAL MEDICINE

## 2024-09-11 PROCEDURE — 25010000002 HEPARIN (PORCINE) PER 1000 UNITS: Performed by: INTERNAL MEDICINE

## 2024-09-11 PROCEDURE — 63710000001 ROSUVASTATIN 20 MG TABLET: Performed by: INTERNAL MEDICINE

## 2024-09-11 PROCEDURE — 63710000001 RANOLAZINE 500 MG TABLET SUSTAINED-RELEASE 12 HOUR: Performed by: INTERNAL MEDICINE

## 2024-09-11 PROCEDURE — 25010000002 HEPARIN (PORCINE) 1000-0.9 UT/500ML-% SOLUTION: Performed by: INTERNAL MEDICINE

## 2024-09-11 PROCEDURE — C9600 PERC DRUG-EL COR STENT SING: HCPCS | Performed by: INTERNAL MEDICINE

## 2024-09-11 PROCEDURE — 25010000002 HEPARIN (PORCINE) 2000-0.9 UNIT/L-% SOLUTION: Performed by: INTERNAL MEDICINE

## 2024-09-11 DEVICE — XIENCE SKYPOINT™ EVEROLIMUS ELUTING CORONARY STENT SYSTEM 2.25 MM X 18 MM / RAPID-EXCHANGE
Type: IMPLANTABLE DEVICE | Status: FUNCTIONAL
Brand: XIENCE SKYPOINT™

## 2024-09-11 RX ORDER — SODIUM CHLORIDE 9 MG/ML
40 INJECTION, SOLUTION INTRAVENOUS AS NEEDED
Status: DISCONTINUED | OUTPATIENT
Start: 2024-09-11 | End: 2024-09-11 | Stop reason: HOSPADM

## 2024-09-11 RX ORDER — ROSUVASTATIN CALCIUM 20 MG/1
20 TABLET, COATED ORAL NIGHTLY
Status: DISCONTINUED | OUTPATIENT
Start: 2024-09-11 | End: 2024-09-12 | Stop reason: HOSPADM

## 2024-09-11 RX ORDER — NITROGLYCERIN 0.4 MG/1
0.4 TABLET SUBLINGUAL
Status: DISCONTINUED | OUTPATIENT
Start: 2024-09-11 | End: 2024-09-11 | Stop reason: SDUPTHER

## 2024-09-11 RX ORDER — SODIUM CHLORIDE 0.9 % (FLUSH) 0.9 %
10 SYRINGE (ML) INJECTION AS NEEDED
Status: DISCONTINUED | OUTPATIENT
Start: 2024-09-11 | End: 2024-09-11 | Stop reason: HOSPADM

## 2024-09-11 RX ORDER — HEPARIN SODIUM 200 [USP'U]/100ML
INJECTION, SOLUTION INTRAVENOUS
Status: DISCONTINUED | OUTPATIENT
Start: 2024-09-11 | End: 2024-09-11 | Stop reason: HOSPADM

## 2024-09-11 RX ORDER — HYDROCODONE BITARTRATE AND ACETAMINOPHEN 5; 325 MG/1; MG/1
1 TABLET ORAL EVERY 6 HOURS PRN
Status: DISCONTINUED | OUTPATIENT
Start: 2024-09-11 | End: 2024-09-12 | Stop reason: HOSPADM

## 2024-09-11 RX ORDER — NITROGLYCERIN 0.4 MG/1
0.4 TABLET SUBLINGUAL
Status: DISCONTINUED | OUTPATIENT
Start: 2024-09-11 | End: 2024-09-12 | Stop reason: HOSPADM

## 2024-09-11 RX ORDER — SODIUM CHLORIDE 0.9 % (FLUSH) 0.9 %
3 SYRINGE (ML) INJECTION EVERY 12 HOURS SCHEDULED
Status: DISCONTINUED | OUTPATIENT
Start: 2024-09-11 | End: 2024-09-11 | Stop reason: HOSPADM

## 2024-09-11 RX ORDER — LIDOCAINE HYDROCHLORIDE 20 MG/ML
INJECTION, SOLUTION INFILTRATION; PERINEURAL
Status: DISCONTINUED | OUTPATIENT
Start: 2024-09-11 | End: 2024-09-11 | Stop reason: HOSPADM

## 2024-09-11 RX ORDER — PANTOPRAZOLE SODIUM 40 MG/1
40 TABLET, DELAYED RELEASE ORAL
Status: DISCONTINUED | OUTPATIENT
Start: 2024-09-12 | End: 2024-09-12 | Stop reason: HOSPADM

## 2024-09-11 RX ORDER — HEPARIN SODIUM 1000 [USP'U]/ML
INJECTION, SOLUTION INTRAVENOUS; SUBCUTANEOUS
Status: DISCONTINUED | OUTPATIENT
Start: 2024-09-11 | End: 2024-09-11 | Stop reason: HOSPADM

## 2024-09-11 RX ORDER — MIDAZOLAM HYDROCHLORIDE 5 MG/5ML
INJECTION, SOLUTION INTRAMUSCULAR; INTRAVENOUS
Status: DISCONTINUED | OUTPATIENT
Start: 2024-09-11 | End: 2024-09-11 | Stop reason: HOSPADM

## 2024-09-11 RX ORDER — ASPIRIN 81 MG/1
324 TABLET, CHEWABLE ORAL ONCE
Status: COMPLETED | OUTPATIENT
Start: 2024-09-11 | End: 2024-09-11

## 2024-09-11 RX ORDER — ASPIRIN 81 MG/1
81 TABLET ORAL DAILY
Status: DISCONTINUED | OUTPATIENT
Start: 2024-09-12 | End: 2024-09-11 | Stop reason: HOSPADM

## 2024-09-11 RX ORDER — FENTANYL CITRATE 50 UG/ML
INJECTION, SOLUTION INTRAMUSCULAR; INTRAVENOUS
Status: DISCONTINUED | OUTPATIENT
Start: 2024-09-11 | End: 2024-09-11 | Stop reason: HOSPADM

## 2024-09-11 RX ORDER — ACETAMINOPHEN 325 MG/1
650 TABLET ORAL EVERY 4 HOURS PRN
Status: DISCONTINUED | OUTPATIENT
Start: 2024-09-11 | End: 2024-09-12 | Stop reason: HOSPADM

## 2024-09-11 RX ORDER — SODIUM CHLORIDE 9 MG/ML
100 INJECTION, SOLUTION INTRAVENOUS CONTINUOUS
Status: DISPENSED | OUTPATIENT
Start: 2024-09-11 | End: 2024-09-11

## 2024-09-11 RX ORDER — RANOLAZINE 500 MG/1
500 TABLET, EXTENDED RELEASE ORAL 2 TIMES DAILY
Status: DISCONTINUED | OUTPATIENT
Start: 2024-09-11 | End: 2024-09-12 | Stop reason: HOSPADM

## 2024-09-11 RX ORDER — IOPAMIDOL 755 MG/ML
INJECTION, SOLUTION INTRAVASCULAR
Status: DISCONTINUED | OUTPATIENT
Start: 2024-09-11 | End: 2024-09-11 | Stop reason: HOSPADM

## 2024-09-11 RX ORDER — METOPROLOL SUCCINATE 25 MG/1
12.5 TABLET, EXTENDED RELEASE ORAL NIGHTLY
Status: DISCONTINUED | OUTPATIENT
Start: 2024-09-11 | End: 2024-09-12 | Stop reason: HOSPADM

## 2024-09-11 RX ORDER — ASPIRIN 81 MG/1
81 TABLET ORAL DAILY
Status: DISCONTINUED | OUTPATIENT
Start: 2024-09-12 | End: 2024-09-12 | Stop reason: HOSPADM

## 2024-09-11 RX ORDER — CLOPIDOGREL BISULFATE 75 MG/1
75 TABLET ORAL DAILY
Status: DISCONTINUED | OUTPATIENT
Start: 2024-09-12 | End: 2024-09-12 | Stop reason: HOSPADM

## 2024-09-11 RX ADMIN — ASPIRIN 324 MG: 81 TABLET, CHEWABLE ORAL at 07:33

## 2024-09-11 RX ADMIN — ROSUVASTATIN CALCIUM 20 MG: 20 TABLET, FILM COATED ORAL at 20:40

## 2024-09-11 RX ADMIN — RANOLAZINE 500 MG: 500 TABLET, FILM COATED, EXTENDED RELEASE ORAL at 20:40

## 2024-09-11 RX ADMIN — SODIUM CHLORIDE 100 ML/HR: 9 INJECTION, SOLUTION INTRAVENOUS at 12:24

## 2024-09-11 RX ADMIN — SODIUM CHLORIDE 369 ML: 9 INJECTION, SOLUTION INTRAVENOUS at 07:33

## 2024-09-11 RX ADMIN — METOPROLOL SUCCINATE 12.5 MG: 25 TABLET, EXTENDED RELEASE ORAL at 20:40

## 2024-09-11 NOTE — Clinical Note
First balloon inflation max pressure = 16 tara. First balloon inflation duration = 25 seconds. Second inflation of balloon - Max pressure = 14 tara. 2nd Inflation of balloon - Duration = 15 seconds. bmp, cbc

## 2024-09-12 VITALS
DIASTOLIC BLOOD PRESSURE: 73 MMHG | TEMPERATURE: 98.8 F | RESPIRATION RATE: 18 BRPM | WEIGHT: 270 LBS | OXYGEN SATURATION: 96 % | HEART RATE: 78 BPM | BODY MASS INDEX: 36.57 KG/M2 | SYSTOLIC BLOOD PRESSURE: 117 MMHG | HEIGHT: 72 IN

## 2024-09-12 DIAGNOSIS — Z95.5 S/P DRUG ELUTING CORONARY STENT PLACEMENT: Primary | ICD-10-CM

## 2024-09-12 LAB
ACT BLD: 281 SECONDS (ref 82–152)
ANION GAP SERPL CALCULATED.3IONS-SCNC: 9 MMOL/L (ref 5–15)
BUN SERPL-MCNC: 13 MG/DL (ref 8–23)
BUN/CREAT SERPL: 13.1 (ref 7–25)
CALCIUM SPEC-SCNC: 9.1 MG/DL (ref 8.6–10.5)
CHLORIDE SERPL-SCNC: 101 MMOL/L (ref 98–107)
CO2 SERPL-SCNC: 27 MMOL/L (ref 22–29)
CREAT SERPL-MCNC: 0.99 MG/DL (ref 0.76–1.27)
DEPRECATED RDW RBC AUTO: 42.9 FL (ref 37–54)
EGFRCR SERPLBLD CKD-EPI 2021: 82.5 ML/MIN/1.73
ERYTHROCYTE [DISTWIDTH] IN BLOOD BY AUTOMATED COUNT: 13.2 % (ref 12.3–15.4)
GLUCOSE SERPL-MCNC: 102 MG/DL (ref 65–99)
HCT VFR BLD AUTO: 40.8 % (ref 37.5–51)
HGB BLD-MCNC: 14 G/DL (ref 13–17.7)
MCH RBC QN AUTO: 30.6 PG (ref 26.6–33)
MCHC RBC AUTO-ENTMCNC: 34.3 G/DL (ref 31.5–35.7)
MCV RBC AUTO: 89.1 FL (ref 79–97)
PLATELET # BLD AUTO: 189 10*3/MM3 (ref 140–450)
PMV BLD AUTO: 10.9 FL (ref 6–12)
POTASSIUM SERPL-SCNC: 3.8 MMOL/L (ref 3.5–5.2)
RBC # BLD AUTO: 4.58 10*6/MM3 (ref 4.14–5.8)
SODIUM SERPL-SCNC: 137 MMOL/L (ref 136–145)
WBC NRBC COR # BLD AUTO: 6.2 10*3/MM3 (ref 3.4–10.8)

## 2024-09-12 PROCEDURE — A9270 NON-COVERED ITEM OR SERVICE: HCPCS | Performed by: INTERNAL MEDICINE

## 2024-09-12 PROCEDURE — 63710000001 RANOLAZINE 500 MG TABLET SUSTAINED-RELEASE 12 HOUR: Performed by: INTERNAL MEDICINE

## 2024-09-12 PROCEDURE — 85027 COMPLETE CBC AUTOMATED: CPT | Performed by: INTERNAL MEDICINE

## 2024-09-12 PROCEDURE — 99214 OFFICE O/P EST MOD 30 MIN: CPT | Performed by: NURSE PRACTITIONER

## 2024-09-12 PROCEDURE — 63710000001 ASPIRIN 81 MG TABLET DELAYED-RELEASE: Performed by: INTERNAL MEDICINE

## 2024-09-12 PROCEDURE — 63710000001 CLOPIDOGREL 75 MG TABLET: Performed by: INTERNAL MEDICINE

## 2024-09-12 PROCEDURE — 80048 BASIC METABOLIC PNL TOTAL CA: CPT | Performed by: INTERNAL MEDICINE

## 2024-09-12 PROCEDURE — 94799 UNLISTED PULMONARY SVC/PX: CPT

## 2024-09-12 PROCEDURE — 63710000001 PANTOPRAZOLE 40 MG TABLET DELAYED-RELEASE: Performed by: INTERNAL MEDICINE

## 2024-09-12 RX ADMIN — PANTOPRAZOLE SODIUM 40 MG: 40 TABLET, DELAYED RELEASE ORAL at 05:56

## 2024-09-12 RX ADMIN — CLOPIDOGREL BISULFATE 75 MG: 75 TABLET, FILM COATED ORAL at 08:34

## 2024-09-12 RX ADMIN — ASPIRIN 81 MG: 81 TABLET, COATED ORAL at 08:34

## 2024-09-12 RX ADMIN — RANOLAZINE 500 MG: 500 TABLET, FILM COATED, EXTENDED RELEASE ORAL at 08:34

## 2024-09-12 NOTE — DISCHARGE SUMMARY
[facility] DISCHARGE    Date of Discharge:  9/12/2024    Discharge Diagnosis:     -Unstable angina (patient with previous inferolateral perfusion defect on stress test, trialed on antianginal therapy now still having rest angina despite maximally tolerated antianginal regimen) s/p successful PCI to cx/OM 2 via femoral approach with JENNY x 1       Presenting Problem/History of Present Illness  Coronary artery disease of native artery of native heart with stable angina pectoris [I25.118]  Stenosis of mid portion of left circumflex coronary artery [I25.10]  S/P drug eluting coronary stent placement [Z95.5]    The patient is followed by Dr. Armstrong as his primary cardiologist.  His primary complaints are fatigue and exertional dyspnea.  He had a Lexiscan for ischemic evaluation in June 2024 that showed an inferolateral perfusion deficit.  He has been treated with antianginal medication.  He did not tolerate Imdur due to orthostatic hypotension.  He also did not tolerate max dose Ranexa but has been tolerating low-dose Ranexa and low-dose Toprol-XL.  However, despite antianginal therapy he has continued to have symptoms consistent with unstable angina.  Therefore, he was scheduled for cardiac catheterization with Dr. Andrews.     His initial cardiac catheterization was 8/28.  At that time he was found to have single-vessel coronary artery disease with mid circumflex/OM 2, 90% stenosis.  Ultimately, PCI was unsuccessful at that point.  See full details in cath report from that date.  He did receive Brilinta and subsequently developed significant side effects from this including acute episodes of shortness of breath that he stated felt like panic attacks.  For this reason he was transitioned to Plavix and his rosuvastatin was also uptitrated to high intensity dose at 20 mg and he was discharged home the following day on 8/29 with plans to return for repeat attempt at PCI, this time via femoral approach  with Dr. Andrews.    Hospital Course  Patient is a 69 y.o. male presented yesterday for repeat attempt at PCI to the circumflex/OM 2 via femoral approach.  Ultimately, the patient did undergo successful PCI to the circumflex/OM 2 per Dr. Andrews on 9/11 with 1 drug-eluting stent.  See full details in cath report.    Today the patient reports he is doing well.  He tells me he can already appreciate improvement in the way he feels.  He states he is no longer having any chest discomfort whatsoever and he has been ambulating back and forth to the bathroom and has no shortness of breath with this, which is an improvement for him.  He is maintaining normal sinus rhythm on telemetry.  He is hemodynamically stable.    He has been referred for cardiac rehab.    He is felt to be stable for discharge on the medical therapy listed below including aspirin 81 mg daily indefinitely and Plavix 75 mg daily without interruption for at least 1 year from PCI.    He will follow-up with his primary cardiologist, Dr. Armstrong, in approximately 2 to 4 weeks.    Procedures Performed  Procedure(s):  Percutaneous Coronary Intervention - LCx  09/11 0908 Note By: Yuri Andrews MD    Impression:  1. Successful PCI to the circumflex/OM 2 artery using a 2.25 x 18 mm drug-eluting stent     Plan:  1. Bedrest for 6 hours  2. Routine post-cath orders for the floor  3. Remain on telemetry for monitoring overnight with anticipated discharge tomorrow.  4. Continue dual antiplatelet therapy for at least 12 months   5. Aggressive risk factor modifications and lifestyle changes  6. Cardiac rehab referral    Yuri Andrews MD  09/11/24  11:37 CDT   Lab Results (last 72 hours)       Procedure Component Value Units Date/Time    Basic Metabolic Panel [[id number]]  (Abnormal) Collected: 09/12/24 0357    Specimen: Blood Updated: 09/12/24 0535     Glucose 102 mg/dL      BUN 13 mg/dL      Creatinine 0.99 mg/dL      Sodium 137 mmol/L      Potassium 3.8 mmol/L       Comment: Slight hemolysis detected by analyzer. Result may be falsely elevated.        Chloride 101 mmol/L      CO2 27.0 mmol/L      Calcium 9.1 mg/dL      BUN/Creatinine Ratio 13.1     Anion Gap 9.0 mmol/L      eGFR 82.5 mL/min/1.73     Narrative:      GFR Normal >60  Chronic Kidney Disease <60  Kidney Failure <15      CBC (No Diff) [[id number]]  (Normal) Collected: 09/12/24 0357    Specimen: Blood Updated: 09/12/24 0500     WBC 6.20 10*3/mm3      RBC 4.58 10*6/mm3      Hemoglobin 14.0 g/dL      Hematocrit 40.8 %      MCV 89.1 fL      MCH 30.6 pg      MCHC 34.3 g/dL      RDW 13.2 %      RDW-SD 42.9 fl      MPV 10.9 fL      Platelets 189 10*3/mm3              Condition on Discharge:  Stable     Physical Exam at Discharge  General: alert and oriented  Card: RRR; NSR 60s-80s on tele  Resp: CTA  Extrem: no edema, right groin access site CDI, no swelling, warmth, redness, hematoma, or drainage, PPP    Vital Signs  Temp:  [97.6 °F (36.4 °C)-99.1 °F (37.3 °C)] 98.8 °F (37.1 °C)  Heart Rate:  [65-95] 78  Resp:  [14-18] 18  BP: (104-121)/(64-84) 117/73        Discharge Disposition  Home or Self Care    Discharge Medications     Discharge Medications        Continue These Medications        Instructions Start Date   aspirin 81 MG EC tablet   81 mg, Oral, Daily      clopidogrel 75 MG tablet  Commonly known as: PLAVIX   75 mg, Oral, Daily      metoprolol succinate XL 25 MG 24 hr tablet  Commonly known as: TOPROL-XL   12.5 mg, Oral, Every Evening      nitroglycerin 0.4 MG SL tablet  Commonly known as: NITROSTAT   0.4 mg, Sublingual, Every 5 Minutes PRN, Take no more than 3 doses in 15 minutes.      omeprazole 20 MG capsule  Commonly known as: priLOSEC   20 mg, Oral, Daily, OTC      ranolazine 500 MG 12 hr tablet  Commonly known as: Ranexa   500 mg, Oral, 2 Times Daily      rosuvastatin 20 MG tablet  Commonly known as: CRESTOR   20 mg, Oral, Nightly               Discharge Diet: heart healthy     Activity at Discharge:   No heavy  lifting for 5-7 days greater than 10 pounds.  No heavy or strenuous pushing or pulling.  No tub baths, hot tubs, swimming pools, or submerging groin underwater for 1 week.  Wash groin site daily with antibacterial soap and water. Rinse and keep clean and dry.  No lotions, powders, or topical ointments to site. Keep open to air and dry.  Call our office with any concerns or if you notice redness, swelling, drainage, warmth, or pain at the site.   Follow-up Appointments  PCP 1-2 weeks    Future Appointments   Date Time Provider Department Center   10/8/2024 11:00 AM Jesus Armstrong MD [facility]            Electronically signed by ANDRES Berrios, 09/12/24, 8:40 AM CDT.     Time:40 minutes

## 2024-09-12 NOTE — DISCHARGE INSTR - ACTIVITY
No heavy lifting for 5-7 days greater than 10 pounds.  No heavy or strenuous pushing or pulling.  No tub baths, hot tubs, swimming pools, or submerging site underwater for 1 week.  Wash site daily with antibacterial soap and water. Rinse and keep clean and dry.  No lotions, powders, or topical ointments to site. Keep open to air and dry.  Call our office with any concerns or if you notice redness, swelling, drainage, warmth, or pain at the site.

## 2024-10-22 ENCOUNTER — LAB (OUTPATIENT)
Dept: LAB | Facility: HOSPITAL | Age: 69
End: 2024-10-22
Payer: MEDICARE

## 2024-10-22 ENCOUNTER — OFFICE VISIT (OUTPATIENT)
Dept: CARDIOLOGY | Facility: CLINIC | Age: 69
End: 2024-10-22
Payer: MEDICARE

## 2024-10-22 VITALS
OXYGEN SATURATION: 99 % | SYSTOLIC BLOOD PRESSURE: 110 MMHG | BODY MASS INDEX: 36.44 KG/M2 | DIASTOLIC BLOOD PRESSURE: 60 MMHG | WEIGHT: 269 LBS | HEART RATE: 75 BPM | HEIGHT: 72 IN

## 2024-10-22 DIAGNOSIS — E78.5 DYSLIPIDEMIA: ICD-10-CM

## 2024-10-22 DIAGNOSIS — R06.09 DYSPNEA ON EXERTION: ICD-10-CM

## 2024-10-22 DIAGNOSIS — I25.10 CORONARY ARTERY DISEASE INVOLVING NATIVE CORONARY ARTERY OF NATIVE HEART WITHOUT ANGINA PECTORIS: Primary | ICD-10-CM

## 2024-10-22 LAB
ALBUMIN SERPL-MCNC: 4.3 G/DL (ref 3.5–5.2)
ALBUMIN/GLOB SERPL: 1.7 G/DL
ALP SERPL-CCNC: 58 U/L (ref 39–117)
ALT SERPL W P-5'-P-CCNC: 20 U/L (ref 1–41)
ANION GAP SERPL CALCULATED.3IONS-SCNC: 9 MMOL/L (ref 5–15)
AST SERPL-CCNC: 19 U/L (ref 1–40)
BILIRUB SERPL-MCNC: 0.6 MG/DL (ref 0–1.2)
BUN SERPL-MCNC: 12 MG/DL (ref 8–23)
BUN/CREAT SERPL: 12.9 (ref 7–25)
CALCIUM SPEC-SCNC: 9.5 MG/DL (ref 8.6–10.5)
CHLORIDE SERPL-SCNC: 102 MMOL/L (ref 98–107)
CO2 SERPL-SCNC: 29 MMOL/L (ref 22–29)
CREAT SERPL-MCNC: 0.93 MG/DL (ref 0.76–1.27)
DEPRECATED RDW RBC AUTO: 44.2 FL (ref 37–54)
EGFRCR SERPLBLD CKD-EPI 2021: 88.9 ML/MIN/1.73
ERYTHROCYTE [DISTWIDTH] IN BLOOD BY AUTOMATED COUNT: 13.2 % (ref 12.3–15.4)
GLOBULIN UR ELPH-MCNC: 2.5 GM/DL
GLUCOSE SERPL-MCNC: 105 MG/DL (ref 65–99)
HCT VFR BLD AUTO: 45.4 % (ref 37.5–51)
HGB BLD-MCNC: 15.1 G/DL (ref 13–17.7)
MCH RBC QN AUTO: 30.1 PG (ref 26.6–33)
MCHC RBC AUTO-ENTMCNC: 33.3 G/DL (ref 31.5–35.7)
MCV RBC AUTO: 90.6 FL (ref 79–97)
NT-PROBNP SERPL-MCNC: 139.5 PG/ML (ref 0–900)
PLATELET # BLD AUTO: 200 10*3/MM3 (ref 140–450)
PMV BLD AUTO: 10.5 FL (ref 6–12)
POTASSIUM SERPL-SCNC: 4.2 MMOL/L (ref 3.5–5.2)
PROT SERPL-MCNC: 6.8 G/DL (ref 6–8.5)
RBC # BLD AUTO: 5.01 10*6/MM3 (ref 4.14–5.8)
SODIUM SERPL-SCNC: 140 MMOL/L (ref 136–145)
TSH SERPL DL<=0.05 MIU/L-ACNC: 0.96 UIU/ML (ref 0.27–4.2)
WBC NRBC COR # BLD AUTO: 5.38 10*3/MM3 (ref 3.4–10.8)

## 2024-10-22 PROCEDURE — 83880 ASSAY OF NATRIURETIC PEPTIDE: CPT

## 2024-10-22 PROCEDURE — 99214 OFFICE O/P EST MOD 30 MIN: CPT | Performed by: HOSPITALIST

## 2024-10-22 PROCEDURE — 85027 COMPLETE CBC AUTOMATED: CPT

## 2024-10-22 PROCEDURE — 36415 COLL VENOUS BLD VENIPUNCTURE: CPT

## 2024-10-22 PROCEDURE — 80053 COMPREHEN METABOLIC PANEL: CPT

## 2024-10-22 PROCEDURE — 84443 ASSAY THYROID STIM HORMONE: CPT

## 2024-10-22 PROCEDURE — G2211 COMPLEX E/M VISIT ADD ON: HCPCS | Performed by: HOSPITALIST

## 2024-10-22 RX ORDER — RANOLAZINE 1000 MG/1
1000 TABLET, EXTENDED RELEASE ORAL 2 TIMES DAILY
COMMUNITY
End: 2024-10-22

## 2024-10-22 RX ORDER — RANOLAZINE 500 MG/1
500 TABLET, EXTENDED RELEASE ORAL 2 TIMES DAILY
Qty: 20 TABLET | Refills: 0 | Status: SHIPPED | OUTPATIENT
Start: 2024-10-22

## 2024-10-22 NOTE — PROGRESS NOTES
Reason For Visit:  CAD, dyslipidemia    Subjective        Jerome Parnell is a 69 y.o. male with the below pertinent PMH who presents for follow-up of the above issues.    Jerome Parnell was most recently seen by me in clinic 8/20/2024 at which time he reported feeling worse with increased fatigue and dyspnea.  He had not tolerated Ranexa 1000 mg twice daily, so he was back to 500 mg twice daily.  He was still having angina.  Plan was to start Toprol-XL 12.5 mg qpm and proceed with coronary angiography.  He did undergo cath identifying and LCx stenosis initially with unsuccessful PCI; he was brought back by Dr. Andrews for another cath on 9/11/2024 at which time he underwent successful PCI.    Today, the patient reports that he unfortunately has not felt much better since his heart cath.  His primary symptom continues to be dyspnea on exertion.  He also has been noticing some fatigue.  He denies chest pain, palpitations, and lightheadedness.  He is concerned that some of his symptoms may be related to medications.    ROS: Pertinent findings are included above.    Cardiac Studies  Echo 6/18/2024: LVEF 60 to 65%, normal LV size/thickness, normal diastolic function, normal RV size/function, normal biatrial size, no hemodynamically significant valve abnormalities.  Lexiscan myocardial perfusion SPECT 6/18/2024: Moderate risk study.  Mild severity stress perfusion defect that is small in size in the inferoseptal and inferior segments and partially reversible.  [facility] 8/28/2024: Single-vessel CAD with a tubular severe 90% stenosis extending from the mid circumflex into the proximal portion of OM 2.  Unsuccessful PCI.  LVEF 45% with inferolateral hypokinesis and normal EDP.  [facility] 9/11/2024: Successful PCI to the LCx extending into OM 2.     Pertinent PMH  CAD  Dyslipidemia  GERD  Obesity  Former tobacco use  Family history of ASCVD (father had CAD with CABG at age 63)    Pertinent past medical, surgical, family, and social  "history were reviewed.      Current Outpatient Medications:     aspirin 81 MG EC tablet, Take 1 tablet by mouth Daily., Disp: 90 tablet, Rfl: 1    clopidogrel (PLAVIX) 75 MG tablet, Take 1 tablet by mouth Daily., Disp: 30 tablet, Rfl: 11    metoprolol succinate XL (TOPROL-XL) 25 MG 24 hr tablet, Take 0.5 tablets by mouth Every Evening., Disp: 30 tablet, Rfl: 5    nitroglycerin (NITROSTAT) 0.4 MG SL tablet, Place 1 tablet under the tongue Every 5 (Five) Minutes As Needed for Chest Pain (Only if SBP Greater Than 100). Take no more than 3 doses in 15 minutes., Disp: 30 tablet, Rfl: 1    omeprazole (priLOSEC) 20 MG capsule, Take 1 capsule by mouth Daily. OTC, Disp: , Rfl:     ranolazine (Ranexa) 500 MG 12 hr tablet, Take 1 tablet by mouth 2 (Two) Times a Day., Disp: 60 tablet, Rfl: 11    rosuvastatin (CRESTOR) 20 MG tablet, Take 1 tablet by mouth Every Night., Disp: 90 tablet, Rfl: 3     Objective   Vital Signs:  /60 (BP Location: Left arm, Patient Position: Sitting, Cuff Size: Adult)   Pulse 75   Ht 182.9 cm (72.01\")   Wt 122 kg (269 lb)   SpO2 99%   BMI 36.47 kg/m²   Estimated body mass index is 36.47 kg/m² as calculated from the following:    Height as of this encounter: 182.9 cm (72.01\").    Weight as of this encounter: 122 kg (269 lb).      Constitutional:       Appearance: Healthy appearance. Not in distress.   Neck:      Vascular: JVD normal.   Pulmonary:      Effort: Pulmonary effort is normal.      Breath sounds: Normal breath sounds.   Cardiovascular:      Normal rate. Regular rhythm.      Murmurs: There is no murmur.      No gallop.  No click. No rub.   Edema:     Peripheral edema absent.   Abdominal:      General: There is no distension.      Palpations: Abdomen is soft.      Tenderness: There is no abdominal tenderness.   Skin:     General: Skin is warm and dry.   Neurological:      Mental Status: Alert and oriented to person, place and time.        Result Review :             ECG 12 " Lead    Date/Time: 10/22/2024 11:31 AM  Performed by: Jesus Armstrong MD    Authorized by: Jesus Armstrong MD  Comparison: compared with previous ECG from 8/28/2024  Comparison to previous ECG: Incomplete right bundle branch block is no longer present  Rhythm: sinus rhythm  Rate: normal  BPM: 71  QRS axis: Right superior axis deviation.  Other findings comments: Pulmonary disease pattern    Clinical impression: abnormal EKG            Assessment and Plan   Diagnoses and all orders for this visit:    1. Coronary artery disease involving native coronary artery of native heart without angina pectoris (Primary)    2. Dyslipidemia    3. Dyspnea on exertion  -     BNP; Future  -     Adult Transthoracic Echo Limited W/ Cont if Necessary Per Protocol; Future  -     Complete PFT - Pre & Post Bronchodilator; Future  -     CBC (No Diff); Future  -     TSH Rfx On Abnormal To Free T4; Future  -     Comprehensive Metabolic Panel; Future    Other orders  -     ranolazine (Ranexa) 500 MG 12 hr tablet; Take 1 tablet by mouth 2 (Two) Times a Day.  Dispense: 20 tablet; Refill: 0  -     ECG 12 Lead      - Now s/p successful PCI but still having dyspnea on exertion.  I reviewed his cath films, which did not show any other evidence of significant CAD suggesting that his shortness of breath is not due to angina.  There was mention of some mild LV dysfunction on his ventriculogram although on my review I do think this appears somewhat borderline.  I will obtain an echo to assess for LV function.  He is concerned about medication side effects, so if LV function is normal I will plan to discontinue his metoprolol.  In the meantime, I will taper him off of Ranexa and have him monitor for any worsening of symptoms.  He is also concerned about the statin, and we discussed that if his fatigue persists despite workup for other etiologies and tapering other medications we will trial him on a different statin to see if symptoms improve.  Lastly, I  do have some concern for a pulmonary etiology; CTA chest from earlier this year did mention some mild bronchial thickening.  I am going to start with obtaining PFTs.  - Continue Crestor 20 mg daily  - Continue aspirin 81 mg daily  - Continue Plavix 75 mg daily  - Continue Toprol-XL 12.5 mg qpm  - Reduce Ranexa to 500 mg twice daily for 1 week and then taper off of this    Follow Up   Return in about 5 weeks (around 11/26/2024).  Patient was given instructions and counseling regarding his condition or for health maintenance advice. Please see specific information pulled into the AVS if appropriate.       EMR Dragon/Transcription disclaimer: Much of this encounter note is an electronic transcription/translation of spoken language to printed text. The electronic translation of spoken language may permit erroneous, or at times, nonsensical words or phrases to be inadvertently transcribed; although I have reviewed the note for such errors, some may still exist.

## 2024-10-23 ENCOUNTER — TELEPHONE (OUTPATIENT)
Dept: CARDIOLOGY | Facility: CLINIC | Age: 69
End: 2024-10-23
Payer: MEDICARE

## 2024-10-23 NOTE — TELEPHONE ENCOUNTER
----- Message from Jesus Armstrong sent at 10/22/2024  4:03 PM CDT -----  Please let him know that his labs are overall stable without any clear findings to explain his symptoms.  I will follow-up on his echo when available.  Thank you!

## 2024-10-25 NOTE — PLAN OF CARE
Problem: Adult Inpatient Plan of Care  Goal: Plan of Care Review  Outcome: Ongoing, Progressing  Goal: Patient-Specific Goal (Individualized)  Outcome: Ongoing, Progressing  Goal: Absence of Hospital-Acquired Illness or Injury  Outcome: Ongoing, Progressing  Intervention: Identify and Manage Fall Risk  Recent Flowsheet Documentation  Taken 9/11/2024 1925 by Catalina Da Silva RN  Safety Promotion/Fall Prevention: safety round/check completed  Goal: Optimal Comfort and Wellbeing  Outcome: Ongoing, Progressing  Goal: Readiness for Transition of Care  Outcome: Ongoing, Progressing     Problem: Arrhythmia/Dysrhythmia (Cardiac Catheterization)  Goal: Stable Heart Rate and Rhythm  Outcome: Ongoing, Progressing     Problem: Bleeding (Cardiac Catheterization)  Goal: Absence of Bleeding  Outcome: Ongoing, Progressing     Problem: Contrast-Induced Injury Risk (Cardiac Catheterization)  Goal: Absence of Contrast-Induced Injury  Outcome: Ongoing, Progressing     Problem: Embolism (Cardiac Catheterization)  Goal: Absence of Embolism Signs and Symptoms  Outcome: Ongoing, Progressing     Problem: Ongoing Anesthesia/Sedation Effects (Cardiac Catheterization)  Goal: Anesthesia/Sedation Recovery  Outcome: Ongoing, Progressing  Intervention: Optimize Anesthesia Recovery  Recent Flowsheet Documentation  Taken 9/11/2024 2200 by Catalina Da Silva RN  Administration (IS): refused  Taken 9/11/2024 1925 by Catalina Da Silva RN  Safety Promotion/Fall Prevention: safety round/check completed     Problem: Pain (Cardiac Catheterization)  Goal: Acceptable Pain Control  Outcome: Ongoing, Progressing     Problem: Vascular Access Protection (Cardiac Catheterization)  Goal: Absence of Vascular Access Complication  Outcome: Ongoing, Progressing     Problem: Pain Acute  Goal: Acceptable Pain Control and Functional Ability  Outcome: Ongoing, Progressing     Problem: Fall Injury Risk  Goal: Absence of Fall and Fall-Related Injury  Outcome: Ongoing,  Progressing  Intervention: Promote Injury-Free Environment  Recent Flowsheet Documentation  Taken 9/11/2024 1925 by Catalina Da Silva, RN  Safety Promotion/Fall Prevention: safety round/check completed     Problem: Hypertension Comorbidity  Goal: Blood Pressure in Desired Range  Outcome: Ongoing, Progressing   Goal Outcome Evaluation:                                               Yes

## 2024-10-30 ENCOUNTER — HOSPITAL ENCOUNTER (OUTPATIENT)
Dept: PULMONOLOGY | Facility: HOSPITAL | Age: 69
Discharge: HOME | End: 2024-10-30
Admitting: HOSPITALIST
Payer: MEDICARE

## 2024-10-30 DIAGNOSIS — R06.09 DYSPNEA ON EXERTION: ICD-10-CM

## 2024-10-30 PROCEDURE — 94726 PLETHYSMOGRAPHY LUNG VOLUMES: CPT

## 2024-10-30 PROCEDURE — 94060 EVALUATION OF WHEEZING: CPT

## 2024-10-30 PROCEDURE — 94729 DIFFUSING CAPACITY: CPT

## 2024-10-30 RX ORDER — ALBUTEROL SULFATE 0.83 MG/ML
2.5 SOLUTION RESPIRATORY (INHALATION) ONCE
Status: COMPLETED | OUTPATIENT
Start: 2024-10-30 | End: 2024-10-30

## 2024-10-30 RX ADMIN — ALBUTEROL SULFATE 2.5 MG: 2.5 SOLUTION RESPIRATORY (INHALATION) at 10:20

## 2024-10-31 DIAGNOSIS — R06.09 DYSPNEA ON EXERTION: Primary | ICD-10-CM

## 2024-10-31 DIAGNOSIS — R94.2 ABNORMAL PFTS (PULMONARY FUNCTION TESTS): ICD-10-CM

## 2024-11-05 ENCOUNTER — TELEPHONE (OUTPATIENT)
Dept: CARDIOLOGY | Facility: CLINIC | Age: 69
End: 2024-11-05
Payer: MEDICARE

## 2024-11-05 NOTE — TELEPHONE ENCOUNTER
Pt informed.  Explained why he was being referred to pulmonology.  He stated understanding.  Norberto Middleton CMA

## 2024-11-05 NOTE — TELEPHONE ENCOUNTER
----- Message from Jesus Armstrong sent at 10/31/2024  4:08 PM CDT -----  Please notify Mr. Gunn of his PFT results, which do show quite significant obstructive ventilatory findings.  Based on this, I am placing a referral to pulmonology as this likely is a significant contributing factor to his shortness of breath.  Thank you!

## 2024-11-06 PROBLEM — J44.89 ASTHMA-COPD OVERLAP SYNDROME: Status: ACTIVE | Noted: 2024-11-06

## 2024-11-06 PROBLEM — J44.89 ASTHMA-COPD OVERLAP SYNDROME: Chronic | Status: ACTIVE | Noted: 2024-11-06

## 2024-11-06 NOTE — PROGRESS NOTES
ANDRES Ham  [facility]   Pulmonary and Critical Care  [address]  [place] [address]  Phone: [phone]  Fax: [phone]           Chief Complaint  dyspea on exertion     Subjective      Jerome Parnell presents to [facility] PULMONARY & CRITICAL CARE MEDICINE      History of Present Illness  Mr. Parnell is a 69 year old male patient referred by his PCP for dyspnea on exertion and abnormal PFT.    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 A subsequent CT scan revealed mild bronchial thickening, leading to a diagnosis of COPD by his primary care physician. He has no history of breathing difficulties prior to this episode. His shortness of breath began in May 2024 and has worsened since the placement of a stent in 2024. He does not have a rescue inhaler and reports no coughing or wheezing. He is scheduled for a Lexiscan and a cardiology appointment in 2024.    Two months ago, he was prescribed Breztri inhaler by Dr. Vidal, but it resulted in severe thrush, affecting his ability to speak. He experiences seasonal allergies in the fall and occasionally takes over-the-counter antihistamines, with the last use being 7 months ago. He has not been tested for sleep apnea and has not been informed of any issues related to it. He does not snore but reports significant fatigue.    He has not yet started cardiac rehabilitation.  He reports no missed doses of aspirin or Plavix. He also reports no issues with glaucoma or urinary retention. He undergoes annual eye exams.    He also has a hernia that he has discussed possible intervention in the past. He was told the intervention could be worse than the hernia itself.     SOCIAL HISTORY  He quit smoking in . Prior to that, he smoked about a pack a day for about 24 years.    FAMILY HISTORY  His mother is . She had heart arrhythmias,  "high blood pressure, high cholesterol, breast cancer, and renal cancer. His father is . He had heart attacks, coronary artery disease, high cholesterol, and high blood pressure. His brother has asthma.    Labs, absolute eosinophils in May of this year 200, TSH in October normal, BNP normal  PFT-with severe obstruction and significant postbronchodilator change of FEV1 18%, lung volumes with obstruction and hyperinflation, mild diffusion impairment when corrected for alveolar volume    Objective   Vital Signs:   /72   Pulse 74   Ht 182.9 cm (72.01\")   Wt 122 kg (268 lb)   SpO2 96% Comment: R/A  BMI 36.34 kg/m²     Physical Exam  Vitals reviewed.   Constitutional:       Appearance: Normal appearance. He is obese.   Cardiovascular:      Rate and Rhythm: Normal rate and regular rhythm.   Pulmonary:      Effort: Pulmonary effort is normal.      Breath sounds: Normal breath sounds.   Neurological:      General: No focal deficit present.      Mental Status: He is alert and oriented to person, place, and time.   Psychiatric:         Mood and Affect: Mood normal.         Behavior: Behavior normal.            Result Review :  The following data was reviewed by: ANDRES Ham on 2024:  Common labs          2024    10:42 2024    03:57 10/22/2024    12:18   Common Labs   Glucose 110  102  105    BUN 13  13  12    Creatinine 1.09  0.99  0.93    Sodium 138  137  140    Potassium 4.1  3.8  4.2    Chloride 99  101  102    Calcium 9.8  9.1  9.5    Albumin   4.3    Total Bilirubin   0.6    Alkaline Phosphatase   58    AST (SGOT)   19    ALT (SGPT)   20    WBC 7.03  6.20  5.38    Hemoglobin 16.4  14.0  15.1    Hematocrit 48.7  40.8  45.4    Platelets 214  189  200      Data reviewed : Radiologic studies chest x-ray 2024    My interpretation of imaging: Medial right basilar opacities  My interpretation of labs: As above  XR Chest 1 View (07/10/2024 19:41)     My interpretation of the " PFT : As below    Results for orders placed during the hospital encounter of 10/30/24    Complete PFT - Pre & Post Bronchodilator    Narrative  [facility] - Pulmonary Function Test    [place] [facility]  [address]  [phone]    Patient : Jerome Parnell  MRN : [id number]  CSN : [id number]  Pulmonologist : Kin Zimmer MD  Date : 10/30/2024    ______________________________________________________________________    Interpretation :  1.  Spirometry is consistent with a severe obstructive ventilatory defect.  2.  There is some improvement in spirometry postbronchodilator but a moderate bordering on severe obstructive ventilatory defect is still present.  3.  Lung volumes reveal a decrease in vital capacity secondary to obstruction.  Hyperinflation is also present.  4.  There is a moderate diffusion impairment which when corrected for alveolar volume is a mild diffusion impairment.      Kin Zimmer MD    Rest/Exercise Pulse Ox Values          11/7/2024    09:00   Rest/Exercise Pulse Ox Results   Rest room air SAT % 96   Exercise room air SAT % 96         Assessment and Plan   Diagnoses and all orders for this visit:    1. Asthma-COPD overlap syndrome (Primary)  Overview:  10/30/2024 severe obstruction, hyperinflation and an 18% improvement in FEV1 postbronchodilator    Orders:  -     Alpha - 1 - Antitrypsin; Future  -     Walking Oximetry  -     Overnight Sleep Oximetry Study; Future    2. Former smoker  -     Walking Oximetry    3. Family history of asthma  Comments:  Brother  Orders:  -     Walking Oximetry    4. BRYANT (dyspnea on exertion)  -     Overnight Sleep Oximetry Study; Future    5. Other fatigue  -     Overnight Sleep Oximetry Study; Future    Other orders  -     Tiotropium Bromide Monohydrate (Spiriva Respimat) 1.25 MCG/ACT aerosol solution inhaler; Inhale 2 puffs Daily.  Dispense: 4 g; Refill: 3  -     albuterol sulfate  (90 Base) MCG/ACT inhaler; Inhale 2 puffs  Every 4 (Four) Hours As Needed for Wheezing.  Dispense: 18 g; Refill: 3      We discussed and reviewed his pulmonary function study results showing likely a COPD asthma overlap giving the significant postbronchodilator response.  He is tested for alpha-1 today.  He is provided handouts on COPD and asthma.  Will continue his as needed albuterol at this time.  A prescription is sent to his pharmacy for Spiriva Respimat.  Handout is provided.  He denies any glaucoma or urinary retention.  Will avoid ICS at this time given his significant reaction to Trelegy with thrush as well as hoarseness.  We reviewed and discussed his echo results.  He had a drug-eluting stent placed in September and he is on DAPT currently with no missed doses.  He is encouraged to schedule his cardiac rehab.  He is agreeable to have an overnight on room air completed through [facility].  We will see if he is hypoxic or what his DIGNA is and if we need to pursue sleep study.    Alpha 1: Tested today  LDCT: Timed out, 24 years since he quit  Smoking Cessation: Former, quit 2000  Vaccinations: He typically gets his vaccinations at the PCP for which he will do this time as well.       Follow Up   Return in about 2 months (around 1/7/2025).  Patient was given instructions and counseling regarding his condition or for health maintenance advice. Please see specific information pulled into the AVS if appropriate.     ANDRES Ham  11/7/2024  13:32 CST    Please note that portions of this note were completed with a voice recognition program.    Patient or patient representative verbalized consent for the use of Ambient Listening during the visit with  ANDRES Ham for chart documentation. 11/7/2024  13:29 CST

## 2024-11-07 ENCOUNTER — OFFICE VISIT (OUTPATIENT)
Dept: PULMONOLOGY | Facility: CLINIC | Age: 69
End: 2024-11-07
Payer: MEDICARE

## 2024-11-07 VITALS
HEART RATE: 74 BPM | HEIGHT: 72 IN | OXYGEN SATURATION: 96 % | SYSTOLIC BLOOD PRESSURE: 122 MMHG | DIASTOLIC BLOOD PRESSURE: 72 MMHG | WEIGHT: 268 LBS | BODY MASS INDEX: 36.3 KG/M2

## 2024-11-07 DIAGNOSIS — J44.89 ASTHMA-COPD OVERLAP SYNDROME: Primary | ICD-10-CM

## 2024-11-07 DIAGNOSIS — Z82.5 FAMILY HISTORY OF ASTHMA: ICD-10-CM

## 2024-11-07 DIAGNOSIS — R06.09 DOE (DYSPNEA ON EXERTION): ICD-10-CM

## 2024-11-07 DIAGNOSIS — R53.83 OTHER FATIGUE: ICD-10-CM

## 2024-11-07 DIAGNOSIS — Z87.891 FORMER SMOKER: ICD-10-CM

## 2024-11-07 RX ORDER — TIOTROPIUM BROMIDE INHALATION SPRAY 1.56 UG/1
2 SPRAY, METERED RESPIRATORY (INHALATION)
Qty: 4 G | Refills: 3 | Status: SHIPPED | OUTPATIENT
Start: 2024-11-07

## 2024-11-07 RX ORDER — ALBUTEROL SULFATE AND BUDESONIDE 90; 80 UG/1; UG/1
2 AEROSOL, METERED RESPIRATORY (INHALATION) AS NEEDED
Qty: 10.7 G | Refills: 0 | Status: CANCELLED | COMMUNITY
Start: 2024-11-07

## 2024-11-07 RX ORDER — ALBUTEROL SULFATE 90 UG/1
2 INHALANT RESPIRATORY (INHALATION) EVERY 4 HOURS PRN
Qty: 18 G | Refills: 3 | Status: SHIPPED | OUTPATIENT
Start: 2024-11-07

## 2024-11-07 NOTE — PROCEDURES
Walking Oximetry    Performed by: Eduarda Thompson MA  Authorized by: Priyanka Galindo APRN    Supplemental Oxygen: None  Rest room air SAT %:  96  Exercise room air SAT %:  96

## 2024-11-14 ENCOUNTER — PATIENT ROUNDING (BHMG ONLY) (OUTPATIENT)
Dept: PULMONOLOGY | Facility: CLINIC | Age: 69
End: 2024-11-14
Payer: MEDICARE

## 2024-11-14 DIAGNOSIS — J44.89 ASTHMA-COPD OVERLAP SYNDROME: ICD-10-CM

## 2024-11-14 DIAGNOSIS — R53.83 OTHER FATIGUE: ICD-10-CM

## 2024-11-14 DIAGNOSIS — R06.09 DOE (DYSPNEA ON EXERTION): ICD-10-CM

## 2024-11-14 NOTE — PROGRESS NOTES
November 14, 2024    Hello, may I speak with Jerome Parnell?    My name is Erin Valdivia    I am  with [facility] PULMONARY & CRITICAL CARE MEDICINE  [address]  [facility] [address] [phone].    Before we get started may I verify your date of birth? 1955    I am calling to officially welcome you to our practice and ask about your recent visit. Is this a good time to talk? yes    Tell me about your visit with us. What things went well?  Visit went very well.       We're always looking for ways to make our patients' experiences even better. Do you have recommendations on ways we may improve?  no    Overall were you satisfied with your first visit to our practice? yes       I appreciate you taking the time to speak with me today. Is there anything else I can do for you? no      Thank you, and have a great day.

## 2024-11-18 DIAGNOSIS — G47.9 SLEEP DISORDER, UNSPECIFIED: ICD-10-CM

## 2024-11-18 DIAGNOSIS — G47.00 FREQUENT NOCTURNAL AWAKENING: ICD-10-CM

## 2024-11-18 DIAGNOSIS — G47.8 NON-RESTORATIVE SLEEP: Primary | ICD-10-CM

## 2024-11-20 ENCOUNTER — TELEPHONE (OUTPATIENT)
Dept: PULMONOLOGY | Facility: CLINIC | Age: 69
End: 2024-11-20
Payer: MEDICARE

## 2024-11-20 DIAGNOSIS — J44.89 ASTHMA-COPD OVERLAP SYNDROME: ICD-10-CM

## 2024-11-20 NOTE — TELEPHONE ENCOUNTER
----- Message from Priyanka Galindo sent at 11/20/2024  8:39 AM CST -----  Please let patient know their alpha 1 results are normal with a MM result.

## 2024-12-10 ENCOUNTER — HOSPITAL ENCOUNTER (OUTPATIENT)
Dept: CARDIOLOGY | Facility: HOSPITAL | Age: 69
Discharge: HOME | End: 2024-12-10
Admitting: HOSPITALIST
Payer: MEDICARE

## 2024-12-10 VITALS
HEIGHT: 72 IN | BODY MASS INDEX: 36.3 KG/M2 | SYSTOLIC BLOOD PRESSURE: 122 MMHG | DIASTOLIC BLOOD PRESSURE: 72 MMHG | WEIGHT: 268 LBS

## 2024-12-10 DIAGNOSIS — R06.09 DYSPNEA ON EXERTION: ICD-10-CM

## 2024-12-10 LAB
BH CV ECHO MEAS - EDV(MOD-SP2): 60.1 ML
BH CV ECHO MEAS - EDV(MOD-SP4): 86.7 ML
BH CV ECHO MEAS - EF(MOD-BP): 74 %
BH CV ECHO MEAS - EF(MOD-SP2): 78.7 %
BH CV ECHO MEAS - EF(MOD-SP4): 74.4 %
BH CV ECHO MEAS - ESV(MOD-SP2): 12.8 ML
BH CV ECHO MEAS - ESV(MOD-SP4): 22.2 ML
BH CV ECHO MEAS - LAT PEAK E' VEL: 8 CM/SEC
BH CV ECHO MEAS - LV DIASTOLIC VOL/BSA (35-75): 35.9 CM2
BH CV ECHO MEAS - LV SYSTOLIC VOL/BSA (12-30): 9.2 CM2
BH CV ECHO MEAS - MED PEAK E' VEL: 8 CM/SEC
BH CV ECHO MEAS - MV A MAX VEL: 68.6 CM/SEC
BH CV ECHO MEAS - MV DEC SLOPE: 239.1 CM/SEC2
BH CV ECHO MEAS - MV DEC TIME: 0.18 SEC
BH CV ECHO MEAS - MV E MAX VEL: 44.2 CM/SEC
BH CV ECHO MEAS - MV E/A: 0.64
BH CV ECHO MEAS - SV(MOD-SP2): 47.3 ML
BH CV ECHO MEAS - SV(MOD-SP4): 64.5 ML
BH CV ECHO MEAS - SVI(MOD-SP2): 19.6 ML/M2
BH CV ECHO MEAS - SVI(MOD-SP4): 26.7 ML/M2
BH CV ECHO MEASUREMENTS AVERAGE E/E' RATIO: 5.53

## 2024-12-10 PROCEDURE — 25510000001 PERFLUTREN 6.52 MG/ML SUSPENSION: Performed by: HOSPITALIST

## 2024-12-10 PROCEDURE — 93321 DOPPLER ECHO F-UP/LMTD STD: CPT

## 2024-12-10 PROCEDURE — 93308 TTE F-UP OR LMTD: CPT

## 2024-12-10 PROCEDURE — 93325 DOPPLER ECHO COLOR FLOW MAPG: CPT

## 2024-12-10 RX ADMIN — PERFLUTREN 13.04 MG: 6.52 INJECTION, SUSPENSION INTRAVENOUS at 15:36

## 2024-12-16 ENCOUNTER — OFFICE VISIT (OUTPATIENT)
Dept: CARDIOLOGY | Facility: CLINIC | Age: 69
End: 2024-12-16
Payer: MEDICARE

## 2024-12-16 VITALS
HEART RATE: 81 BPM | BODY MASS INDEX: 36.98 KG/M2 | WEIGHT: 273 LBS | HEIGHT: 72 IN | OXYGEN SATURATION: 97 % | DIASTOLIC BLOOD PRESSURE: 81 MMHG | SYSTOLIC BLOOD PRESSURE: 132 MMHG

## 2024-12-16 DIAGNOSIS — I25.10 CORONARY ARTERY DISEASE INVOLVING NATIVE CORONARY ARTERY OF NATIVE HEART WITHOUT ANGINA PECTORIS: Primary | ICD-10-CM

## 2024-12-16 DIAGNOSIS — E78.5 DYSLIPIDEMIA: ICD-10-CM

## 2024-12-16 DIAGNOSIS — R06.09 DYSPNEA ON EXERTION: ICD-10-CM

## 2024-12-16 NOTE — PROGRESS NOTES
Reason For Visit:  CAD, dyslipidemia    Subjective        Jerome Parnell is a 69 y.o. male with the below pertinent PMH who presents for follow-up of the above issues.    Jerome Parnell was most recently seen by me in clinic 10/22/2024 for follow-up after PCI 9/11/2024.  At his visit, the patient reported that he unfortunately was not feeling much better and continued to have dyspnea on exertion and fatigue.  The plan was to taper him off of Ranexa and obtain an echocardiogram with consideration to stopping metoprolol if LV function was normal.  He also was referred for PFT that ultimately demonstrated a severe obstructive ventilatory defect s with some diffusion impairment and bronchodilator response; he was referred to pulmonology.      Today the patient reports that he has establish care with pulmonology and was started on some inhalers, which have been helpful for his shortness of breath.  He also was told that he is having some nocturnal hypoxia for which she is wearing oxygen.  He has a sleep study scheduled in the next couple of months.  He continues to have some shortness of breath but is pleased with improvement and reports that he is now able to do more activity than he had been in the past.  He denies any exertional chest discomfort.  He also denies significant palpitations, lightheadedness, or peripheral edema.  He states that his PCP has talked him about potentially starting Leqvio, but they are waiting until he gets his repeat labs in February.    ROS: Pertinent findings are included above.    Cardiac Studies  Echo 6/18/2024: LVEF 60 to 65%, normal LV size/thickness, normal diastolic function, normal RV size/function, normal biatrial size, no hemodynamically significant valve abnormalities.  Lexiscan myocardial perfusion SPECT 6/18/2024: Moderate risk study.  Mild severity stress perfusion defect that is small in size in the inferoseptal and inferior segments and partially reversible.  [facility] 8/28/2024:  "Single-vessel CAD with a tubular severe 90% stenosis extending from the mid circumflex into the proximal portion of OM 2.  Unsuccessful PCI.  LVEF 45% with inferolateral hypokinesis and normal EDP.  [facility] 9/11/2024: Successful PCI to the LCx extending into OM 2.  Echo 12/10/2024: Technically difficult study.  LVEF 56 to 60%.  LV filling pattern suggestive of impaired relaxation.     Pertinent PMH  CAD s/p LCx PCI  Dyslipidemia  GERD  Obesity  COPD and asthma  Former tobacco use  Family history of ASCVD (father had CAD with CABG at age 63)    Pertinent past medical, surgical, family, and social history were reviewed.      Current Outpatient Medications:     albuterol sulfate  (90 Base) MCG/ACT inhaler, Inhale 2 puffs Every 4 (Four) Hours As Needed for Wheezing., Disp: 18 g, Rfl: 3    aspirin 81 MG EC tablet, Take 1 tablet by mouth Daily., Disp: 90 tablet, Rfl: 1    clopidogrel (PLAVIX) 75 MG tablet, Take 1 tablet by mouth Daily., Disp: 30 tablet, Rfl: 11    metoprolol succinate XL (TOPROL-XL) 25 MG 24 hr tablet, Take 0.5 tablets by mouth Every Evening., Disp: 30 tablet, Rfl: 5    nitroglycerin (NITROSTAT) 0.4 MG SL tablet, Place 1 tablet under the tongue Every 5 (Five) Minutes As Needed for Chest Pain (Only if SBP Greater Than 100). Take no more than 3 doses in 15 minutes., Disp: 30 tablet, Rfl: 1    omeprazole (priLOSEC) 20 MG capsule, Take 1 capsule by mouth Daily. OTC, Disp: , Rfl:     rosuvastatin (CRESTOR) 20 MG tablet, Take 1 tablet by mouth Every Night., Disp: 90 tablet, Rfl: 3    Tiotropium Bromide Monohydrate (Spiriva Respimat) 1.25 MCG/ACT aerosol solution inhaler, Inhale 2 puffs Daily., Disp: 4 g, Rfl: 3     Objective   Vital Signs:  /81   Pulse 81   Ht 182.9 cm (72\")   Wt 124 kg (273 lb)   SpO2 97%   BMI 37.03 kg/m²   Estimated body mass index is 37.03 kg/m² as calculated from the following:    Height as of this encounter: 182.9 cm (72\").    Weight as of this encounter: 124 kg (273 " lb).      Constitutional:       Appearance: Healthy appearance. Not in distress.   Neck:      Vascular: JVD normal.   Pulmonary:      Effort: Pulmonary effort is normal.      Breath sounds: Normal breath sounds.   Cardiovascular:      Normal rate. Regular rhythm.      Murmurs: There is no murmur.      No gallop.  No click. No rub.   Edema:     Peripheral edema absent.   Abdominal:      General: There is no distension.      Palpations: Abdomen is soft.      Tenderness: There is no abdominal tenderness.   Skin:     General: Skin is warm and dry.   Neurological:      Mental Status: Alert and oriented to person, place and time.        Result Review :             ECG 12 Lead    Date/Time: 12/16/2024 2:31 PM  Performed by: Jesus Armstrong MD    Authorized by: Jesus Armstrong MD  Comparison: compared with previous ECG from 10/22/2024  Similar to previous ECG  Rhythm: sinus rhythm  Rate: normal  BPM: 81  Conduction: conduction normal  QRS axis: Right superior axis deviation.  Other findings comments: Pulmonary disease pattern    Clinical impression: abnormal EKG            Assessment and Plan   Diagnoses and all orders for this visit:    1. Coronary artery disease involving native coronary artery of native heart without angina pectoris (Primary)  2. Dyslipidemia  -Stop Toprol-XL and monitor for development of more elevated BP (he has not had a history of hypertension in the past)  - Continue aspirin 81 mg daily  - Continue Plavix 75 mg daily  - Continue Crestor 20 mg daily  - He has plans to get a lipid panel with his PCP in February and I agree that it is reasonable to add Leqvio if his LDL is above 55.  I did discuss with him that if he is ultimately started on Leqvio and has a significant reduction in LDL it is possible that we could reduce the dose of his Crestor some.    3. Dyspnea on exertion  Seems multifactorial and at this juncture likely more related to underlying lung disease and untreated sleep apnea.  He is  going to continue to follow-up with pulmonology and has a sleep study pending.    Follow Up   Return in about 6 months (around 6/16/2025).  Patient was given instructions and counseling regarding his condition or for health maintenance advice. Please see specific information pulled into the AVS if appropriate.       EMR Dragon/Transcription disclaimer: Much of this encounter note is an electronic transcription/translation of spoken language to printed text. The electronic translation of spoken language may permit erroneous, or at times, nonsensical words or phrases to be inadvertently transcribed; although I have reviewed the note for such errors, some may still exist.

## 2025-01-02 ENCOUNTER — TREATMENT (OUTPATIENT)
Dept: CARDIAC REHAB | Facility: HOSPITAL | Age: 70
End: 2025-01-02
Payer: MEDICARE

## 2025-01-02 DIAGNOSIS — Z95.5 S/P DRUG ELUTING CORONARY STENT PLACEMENT: Primary | ICD-10-CM

## 2025-01-02 PROCEDURE — 93798 PHYS/QHP OP CAR RHAB W/ECG: CPT

## 2025-01-07 ENCOUNTER — APPOINTMENT (OUTPATIENT)
Dept: CARDIAC REHAB | Facility: HOSPITAL | Age: 70
End: 2025-01-07
Payer: MEDICARE

## 2025-01-07 NOTE — PROGRESS NOTES
ANDRES Ham  [facility]   Pulmonary and Critical Care  [address] [place] [address]  Phone: [phone]  Fax: [phone]           Chief Complaint  Asthma-COPD overlap syndrome    Subjective      Jerome MARIAELENA Parnell presents to [facility] PULMONARY & CRITICAL CARE MEDICINE     History of Present Illness  Mr. Parnell is a 69-year-old male patient with known asthma COPD overlap, seasonal allergies, CAD, GERD, hyperlipidemia, hypertension, arthritis.    He reports an improvement in his condition with the use of Spiriva. He uses Albuterol in the morning to facilitate lung expansion. He has not experienced any fevers, chills, night sweats, or wheezing. His shortness of breath has improved. He continues to use 2 L of oxygen at night but reports difficulty keeping the cannula in place during sleep due to discomfort. He has attempted to alleviate this issue by purchasing a rubber cannula from [employer], but it remains uncomfortable. A sleep study is scheduled for the following month. He was on Toprol at the time of his overnight pulse oximetry test and believes it may have affected his heart rate.     He experienced a mild episode of dizziness yesterday, which was particularly noticeable when transitioning from lying down to standing up. This is a new symptom for him. He did not monitor his oxygen levels during this episode but reports that his blood pressure and heart rate were within normal limits. He is currently on anticoagulant therapy and is concerned about potential bleeding complications. He has not observed any blood in his stool or urine, hemoptysis, epistaxis, or melena.     He typically experiences exacerbations of his seasonal allergies during the fall season, which he manages with over-the-counter antihistamines. However, he has not required this treatment during the current fall season.    His reflux symptoms are well-managed with Prilosec.    He has not  "received his influenza or pneumonia vaccines.      Overnight showed less than 88% for 31 minutes with a low SpO2 of 84% and an DIGNA of 35.  He was started on 2 L of supplemental oxygen and sleep study is pending.      Objective   Vital Signs:   /72   Pulse 86   Ht 182.9 cm (72\")   Wt 128 kg (281 lb 8 oz)   SpO2 95% Comment: RA  BMI 38.18 kg/m²     Physical Exam  Vitals reviewed.   Constitutional:       Appearance: Normal appearance.   Cardiovascular:      Rate and Rhythm: Normal rate and regular rhythm.   Pulmonary:      Effort: Pulmonary effort is normal.      Breath sounds: Normal breath sounds.   Neurological:      General: No focal deficit present.      Mental Status: He is alert and oriented to person, place, and time.   Psychiatric:         Mood and Affect: Mood normal.         Behavior: Behavior normal.            Result Review :  The following data was reviewed by: ANDRES Ham on 01/08/2025:    Data reviewed : Radiologic studies 2D echo    My interpretation of imaging: Echo showed EF 56 to 60%, difficult study technically, suggestion of impaired relaxation.  My interpretation of labs: No new  Adult Transthoracic Echo Limited W/ Cont if Necessary Per Protocol (12/10/2024 15:35)   Overnight Sleep Oximetry Study (11/13/2024 00:00)   My interpretation of the PFT : No new    Results for orders placed during the hospital encounter of 10/30/24    Complete PFT - Pre & Post Bronchodilator    Narrative  [facility] - Pulmonary Function Test    [address] [place] [address]  [place]  [id number]  [phone]    Patient : Jerome Parnell  MRN : [id number]  CSN : [id number]  Pulmonologist : Kin Zimmer MD  Date : 10/30/2024    ______________________________________________________________________    Interpretation :  1.  Spirometry is consistent with a severe obstructive ventilatory defect.  2.  There is some improvement in spirometry postbronchodilator but a moderate bordering on severe " obstructive ventilatory defect is still present.  3.  Lung volumes reveal a decrease in vital capacity secondary to obstruction.  Hyperinflation is also present.  4.  There is a moderate diffusion impairment which when corrected for alveolar volume is a mild diffusion impairment.      Kin Zimmer MD    Rest/Exercise Pulse Ox Values          11/7/2024    09:00   Rest/Exercise Pulse Ox Results   Rest room air SAT % 96   Exercise room air SAT % 96         Assessment and Plan   Diagnoses and all orders for this visit:    1. Asthma-COPD overlap syndrome (Primary)  Overview:  10/30/2024 severe obstruction, hyperinflation and an 18% improvement in FEV1 postbronchodilator    Assessment & Plan:  Continue Spiriva and as needed albuterol HFA            2. GERD without esophagitis  Assessment & Plan:  Continue Prilosec      3. Nocturnal hypoxia  Overview:  Overnight Sleep Oximetry Study (11/13/2024 00:00)   [id number] L  Walking oximetry November 2024 did not qualify him for exertional daytime supplemental oxygen.    Assessment & Plan:  Continue nocturnal supplemental oxygen and keep appointment for sleep study.            Alpha 1: Normal, MM  LDCT: Does not qualify, timed out  Smoking Cessation: Former, quit 2000  Vaccinations: Reminded to discuss with his PCP as he usually obtains vaccinations at his PCP.       Follow Up   Return in about 6 months (around 7/8/2025).  Patient was given instructions and counseling regarding his condition or for health maintenance advice. Please see specific information pulled into the AVS if appropriate.     ANDRES Ham  1/8/2025  09:31 CST    Please note that portions of this note were completed with a voice recognition program.    Patient or patient representative verbalized consent for the use of Ambient Listening during the visit with  ANDRES Ham for chart documentation. 1/8/2025  09:31 CST

## 2025-01-08 ENCOUNTER — OFFICE VISIT (OUTPATIENT)
Dept: PULMONOLOGY | Facility: CLINIC | Age: 70
End: 2025-01-08
Payer: MEDICARE

## 2025-01-08 VITALS
BODY MASS INDEX: 38.13 KG/M2 | OXYGEN SATURATION: 95 % | DIASTOLIC BLOOD PRESSURE: 72 MMHG | HEIGHT: 72 IN | WEIGHT: 281.5 LBS | SYSTOLIC BLOOD PRESSURE: 120 MMHG | HEART RATE: 86 BPM

## 2025-01-08 DIAGNOSIS — G47.34 NOCTURNAL HYPOXIA: Chronic | ICD-10-CM

## 2025-01-08 DIAGNOSIS — J44.89 ASTHMA-COPD OVERLAP SYNDROME: Primary | Chronic | ICD-10-CM

## 2025-01-08 DIAGNOSIS — K21.9 GERD WITHOUT ESOPHAGITIS: Chronic | ICD-10-CM

## 2025-01-08 PROCEDURE — 99213 OFFICE O/P EST LOW 20 MIN: CPT | Performed by: NURSE PRACTITIONER

## 2025-01-08 PROCEDURE — 1159F MED LIST DOCD IN RCRD: CPT | Performed by: NURSE PRACTITIONER

## 2025-01-08 PROCEDURE — 1160F RVW MEDS BY RX/DR IN RCRD: CPT | Performed by: NURSE PRACTITIONER

## 2025-01-09 ENCOUNTER — TREATMENT (OUTPATIENT)
Dept: CARDIAC REHAB | Facility: HOSPITAL | Age: 70
End: 2025-01-09
Payer: MEDICARE

## 2025-01-09 DIAGNOSIS — Z95.5 S/P DRUG ELUTING CORONARY STENT PLACEMENT: Primary | ICD-10-CM

## 2025-01-09 PROCEDURE — 93798 PHYS/QHP OP CAR RHAB W/ECG: CPT

## 2025-01-14 ENCOUNTER — TREATMENT (OUTPATIENT)
Dept: CARDIAC REHAB | Facility: HOSPITAL | Age: 70
End: 2025-01-14
Payer: MEDICARE

## 2025-01-14 DIAGNOSIS — Z95.5 S/P DRUG ELUTING CORONARY STENT PLACEMENT: Primary | ICD-10-CM

## 2025-01-14 PROCEDURE — 93798 PHYS/QHP OP CAR RHAB W/ECG: CPT

## 2025-01-16 ENCOUNTER — TREATMENT (OUTPATIENT)
Dept: CARDIAC REHAB | Facility: HOSPITAL | Age: 70
End: 2025-01-16
Payer: MEDICARE

## 2025-01-16 DIAGNOSIS — Z95.5 S/P DRUG ELUTING CORONARY STENT PLACEMENT: Primary | ICD-10-CM

## 2025-01-16 PROCEDURE — 93798 PHYS/QHP OP CAR RHAB W/ECG: CPT

## 2025-01-21 ENCOUNTER — TREATMENT (OUTPATIENT)
Dept: CARDIAC REHAB | Facility: HOSPITAL | Age: 70
End: 2025-01-21
Payer: MEDICARE

## 2025-01-21 DIAGNOSIS — Z95.5 S/P DRUG ELUTING CORONARY STENT PLACEMENT: Primary | ICD-10-CM

## 2025-01-21 PROCEDURE — 93798 PHYS/QHP OP CAR RHAB W/ECG: CPT

## 2025-01-23 ENCOUNTER — TREATMENT (OUTPATIENT)
Dept: CARDIAC REHAB | Facility: HOSPITAL | Age: 70
End: 2025-01-23
Payer: MEDICARE

## 2025-01-23 DIAGNOSIS — Z95.5 S/P DRUG ELUTING CORONARY STENT PLACEMENT: Primary | ICD-10-CM

## 2025-01-23 PROCEDURE — 93798 PHYS/QHP OP CAR RHAB W/ECG: CPT

## 2025-01-28 ENCOUNTER — TREATMENT (OUTPATIENT)
Dept: CARDIAC REHAB | Facility: HOSPITAL | Age: 70
End: 2025-01-28
Payer: MEDICARE

## 2025-01-28 DIAGNOSIS — Z95.5 S/P DRUG ELUTING CORONARY STENT PLACEMENT: Primary | ICD-10-CM

## 2025-01-28 PROCEDURE — 93798 PHYS/QHP OP CAR RHAB W/ECG: CPT

## 2025-01-30 ENCOUNTER — TREATMENT (OUTPATIENT)
Dept: CARDIAC REHAB | Facility: HOSPITAL | Age: 70
End: 2025-01-30
Payer: MEDICARE

## 2025-01-30 DIAGNOSIS — Z95.5 S/P DRUG ELUTING CORONARY STENT PLACEMENT: Primary | ICD-10-CM

## 2025-01-30 PROCEDURE — 93798 PHYS/QHP OP CAR RHAB W/ECG: CPT

## 2025-02-04 ENCOUNTER — TREATMENT (OUTPATIENT)
Dept: CARDIAC REHAB | Facility: HOSPITAL | Age: 70
End: 2025-02-04
Payer: MEDICARE

## 2025-02-04 DIAGNOSIS — Z95.5 S/P DRUG ELUTING CORONARY STENT PLACEMENT: Primary | ICD-10-CM

## 2025-02-04 PROCEDURE — 93798 PHYS/QHP OP CAR RHAB W/ECG: CPT

## 2025-02-06 ENCOUNTER — TREATMENT (OUTPATIENT)
Dept: CARDIAC REHAB | Facility: HOSPITAL | Age: 70
End: 2025-02-06
Payer: MEDICARE

## 2025-02-06 ENCOUNTER — TELEPHONE (OUTPATIENT)
Dept: PULMONOLOGY | Facility: CLINIC | Age: 70
End: 2025-02-06
Payer: MEDICARE

## 2025-02-06 DIAGNOSIS — Z95.5 S/P DRUG ELUTING CORONARY STENT PLACEMENT: Primary | ICD-10-CM

## 2025-02-06 PROCEDURE — 93798 PHYS/QHP OP CAR RHAB W/ECG: CPT

## 2025-02-06 NOTE — TELEPHONE ENCOUNTER
----- Message from Priyanka Galindo sent at 2/6/2025  3:44 PM CST -----  Please let the patient know that his insurance denied sleep study.  We cannot do a peer to peer but we could do an appeal letter if he wishes to pursue that.  It would be iffy  if they will approve it given minimal symptoms and an [place] scale score of only 3.  If he wishes to pursue it then I will draft up an appeal letter.    ANDRES Mendieta  ----- Message -----  From: Carla Campbell  Sent: 2/6/2025   1:22 PM CST  To: ANDRES Ham    After reviewing the denial letter, the only option will be to do an Appeal.  A letter stating why you believe the study should be approved.  There is a Standard Appeal and a Fast Appeal.  Once you get that done I will fax it for approval.

## 2025-02-06 NOTE — TELEPHONE ENCOUNTER
Spoke with patient he stated he received an email about it and he doesn't wish to pursue it at this time . Thank you.

## 2025-03-13 DIAGNOSIS — J44.89 ASTHMA-COPD OVERLAP SYNDROME: Primary | ICD-10-CM

## 2025-03-13 RX ORDER — TIOTROPIUM BROMIDE INHALATION SPRAY 1.56 UG/1
2 SPRAY, METERED RESPIRATORY (INHALATION)
Qty: 4 G | Refills: 3 | Status: SHIPPED | OUTPATIENT
Start: 2025-03-13

## 2025-04-23 RX ORDER — IPRATROPIUM BROMIDE AND ALBUTEROL SULFATE 2.5; .5 MG/3ML; MG/3ML
3 SOLUTION RESPIRATORY (INHALATION)
COMMUNITY
Start: 2025-02-20 | End: 2025-04-25 | Stop reason: SDUPTHER

## 2025-04-24 NOTE — PROGRESS NOTES
ANDRES Ham  [facility]   Pulmonary and Critical Care  [address] [place] [address]  Phone: [phone]  Fax: [phone]           Chief Complaint  Asthma-COPD overlap syndrome (Has had increased shortness of breath since last spring, cardiology found blockage and did stent, helped with chest pain but did not help shortness of breath. Patient states it gets better sometimes but then gets bad again. His O2 sats never drop too low. He has oxygen he is to use at night but does not use it as he should due to the it falling off.)    Subjective        Jerome Parnell presents to [facility] PULMONARY & CRITICAL CARE MEDICINE     History of Present Illness  Mr. Parnell is a 69-year-old male patient with known asthma COPD overlap, seasonal allergies, CAD, GERD, hyperlipidemia, hypertension, arthritis.     He reports a significant exacerbation of his symptoms since his last visit in 01/2025. In 02/2025, he experienced severe congestion and symptoms reminiscent of an asthma attack, although he has no prior history of such episodes. He describes a sensation of chest tightness and difficulty breathing, which was particularly pronounced during sleep.he feels he may have overused his albuterol at that time as his heart raced.  He sought immediate medical attention from Dr. Valdivia, who administered a steroid injection and prescribed a 10-day course of prednisone 50 mg and DuoNebs. These interventions provided temporary relief, but his symptoms have since recurred. This morning, he was awakened at 4:00 AM by severe wheezing and shortness of breath, with a pulse oximetry reading of 93% and a heart rate of 101. He used his albuterol inhaler at 4:00 AM, which provided some relief, and also used his nebulizer at 8:00 AM. He has been using his nebulizer three times daily for the past few days, but notes that his congestion has been worsening. He reports minimal drainage and has  "not undergone a chest x-ray. He attributes his symptoms to POLLEN EXPOSURE and has been taking over-the-counter antihistamines as needed. He also reports a rattling sensation in his chest, gagging, and coughing, and was able to expectorate thick sputum this morning, which provided some relief. He reports no postnasal drip or rhinorrhea. He is currently on Spiriva and has approximately 8 to 9 doses remaining of his medications.        Objective   Vital Signs:   /90   Pulse 89   Ht 182.9 cm (72\")   Wt 127 kg (280 lb 6.4 oz)   SpO2 94% Comment: RA  BMI 38.03 kg/m²     Physical Exam  Vitals reviewed.   Constitutional:       Appearance: Normal appearance.   Cardiovascular:      Rate and Rhythm: Normal rate and regular rhythm.   Pulmonary:      Effort: Pulmonary effort is normal.      Breath sounds: Examination of the right-upper field reveals wheezing. Examination of the left-upper field reveals wheezing. Examination of the right-middle field reveals wheezing. Examination of the left-middle field reveals wheezing. Examination of the right-lower field reveals wheezing. Examination of the left-lower field reveals wheezing. Wheezing present.   Neurological:      General: No focal deficit present.      Mental Status: He is alert and oriented to person, place, and time.   Psychiatric:         Mood and Affect: Mood normal.         Behavior: Behavior normal.             Result Review :  The following data was reviewed by: ANDRES Ham on 04/25/2025:    My interpretation of imaging: No new  My interpretation of labs: No new      My interpretation of the PFT : no new     Results for orders placed during the hospital encounter of 10/30/24    Complete PFT - Pre & Post Bronchodilator    Narrative  [facility] - Pulmonary Function Test    [address] [place] [address]  [id number]  [phone]    Patient : Jerome Parnell  MRN : [id number]  CSN : [id number]  Pulmonologist : Kin Zimmer MD  Date : " 10/30/2024    ______________________________________________________________________    Interpretation :  1.  Spirometry is consistent with a severe obstructive ventilatory defect.  2.  There is some improvement in spirometry postbronchodilator but a moderate bordering on severe obstructive ventilatory defect is still present.  3.  Lung volumes reveal a decrease in vital capacity secondary to obstruction.  Hyperinflation is also present.  4.  There is a moderate diffusion impairment which when corrected for alveolar volume is a mild diffusion impairment.      Kin Zimmer MD    Rest/Exercise Pulse Ox Values          11/7/2024    09:00 4/25/2025    10:00   Rest/Exercise Pulse Ox Results   Rest room air SAT % 96 96   Exercise room air SAT % 96 94       Assessment and Plan   Diagnoses and all orders for this visit:    1. Wheezing (Primary)  -     methylPREDNISolone acetate (DEPO-medrol) injection 80 mg    2. Asthma-COPD overlap syndrome  Comments:  asthma exacerbation  Overview:  10/30/2024 severe obstruction, hyperinflation and an 18% improvement in FEV1 postbronchodilator    Orders:  -     XR Chest 2 View; Future  -     methylPREDNISolone acetate (DEPO-medrol) injection 80 mg    3. Nocturnal hypoxia  Overview:  Overnight Sleep Oximetry Study (11/13/2024 00:00)   IAQ-Espjta-6 L  Walking oximetry November 2024 did not qualify him for exertional daytime supplemental oxygen.    Orders:  -     methylPREDNISolone acetate (DEPO-medrol) injection 80 mg    4. GERD without esophagitis  -     methylPREDNISolone acetate (DEPO-medrol) injection 80 mg    5. Shortness of breath  -     Walking Oximetry  -     XR Chest 2 View; Future  -     methylPREDNISolone acetate (DEPO-medrol) injection 80 mg    Other orders  -     predniSONE (DELTASONE) 10 MG tablet; Take 4 tabs daily x 3 days, then take 3 tabs daily x 3 days, then take 2 tabs daily x 3 days, then take 1 tab daily x 3 days  Dispense: 31 tablet; Refill: 0  -      budesonide-formoterol (SYMBICORT) 160-4.5 MCG/ACT inhaler; Inhale 2 puffs 2 (Two) Times a Day.  Dispense: 10.2 g; Refill: 3  -     Albuterol-Budesonide (Airsupra) 90-80 MCG/ACT aerosol; Inhale 2 puffs As Needed (wheezing, coughing).  Dispense: 10.7 g; Refill: 3  -     ipratropium-albuterol (DUO-NEB) 0.5-2.5 mg/3 ml nebulizer; Take 3 mL by nebulization 4 (Four) Times a Day.  Dispense: 360 mL; Refill: 0    Patient instructions:   #1.  Discontinue Spiriva  #2 begin Symbicort 2 puffs twice daily and rinse her mouth out after use  #3 begin Airsupra unless not covered by your insurance then continue as needed albuterol  #4 continue as needed DuoNebs but do not use them at the same time as the rescue inhaler it is one of the other  #5 scripts for Symbicort Airsupra and DuoNebs as well as prednisone have been sent to your pharmacy  #6 present for chest x-ray at [facility]  #7 begin prednisone tomorrow as you were given a steroid shot today  #8 keep follow-up in July or call if needed to be sooner      Jerome Parnell  reports that he has quit smoking. His smoking use included cigarettes. He started smoking about 25 years ago. He has a 25.3 pack-year smoking history. He has been exposed to tobacco smoke. He has never used smokeless tobacco.             Alpha 1: Normal, MM  LDCT: Does not qualify, timed out  Smoking Cessation: Former, quit 2000  Vaccinations: Reminded to discuss with his PCP as he usually obtains vaccinations at his PCP.        Follow Up   No follow-ups on file.  Patient was given instructions and counseling regarding his condition or for health maintenance advice. Please see specific information pulled into the AVS if appropriate.     ANDRES Ham  4/25/2025  13:53 CDT    Please note that portions of this note were completed with a voice recognition program.    Patient or patient representative verbalized consent for the use of Ambient Listening during the visit with  Priyakna Galindo  APRN for chart documentation. 4/25/2025  16:51 CDT

## 2025-04-25 ENCOUNTER — HOSPITAL ENCOUNTER (OUTPATIENT)
Dept: GENERAL RADIOLOGY | Facility: HOSPITAL | Age: 70
Discharge: HOME | End: 2025-04-25
Payer: MEDICARE

## 2025-04-25 ENCOUNTER — OFFICE VISIT (OUTPATIENT)
Dept: PULMONOLOGY | Facility: CLINIC | Age: 70
End: 2025-04-25
Payer: MEDICARE

## 2025-04-25 VITALS
WEIGHT: 280.4 LBS | DIASTOLIC BLOOD PRESSURE: 90 MMHG | BODY MASS INDEX: 37.98 KG/M2 | OXYGEN SATURATION: 94 % | SYSTOLIC BLOOD PRESSURE: 132 MMHG | HEIGHT: 72 IN | HEART RATE: 89 BPM

## 2025-04-25 DIAGNOSIS — J44.89 ASTHMA-COPD OVERLAP SYNDROME: Chronic | ICD-10-CM

## 2025-04-25 DIAGNOSIS — R06.02 SHORTNESS OF BREATH: ICD-10-CM

## 2025-04-25 DIAGNOSIS — K21.9 GERD WITHOUT ESOPHAGITIS: Chronic | ICD-10-CM

## 2025-04-25 DIAGNOSIS — R06.2 WHEEZING: Primary | ICD-10-CM

## 2025-04-25 DIAGNOSIS — G47.34 NOCTURNAL HYPOXIA: Chronic | ICD-10-CM

## 2025-04-25 PROCEDURE — 71046 X-RAY EXAM CHEST 2 VIEWS: CPT

## 2025-04-25 RX ORDER — IPRATROPIUM BROMIDE AND ALBUTEROL SULFATE 2.5; .5 MG/3ML; MG/3ML
3 SOLUTION RESPIRATORY (INHALATION)
Qty: 360 ML | Refills: 0 | Status: SHIPPED | OUTPATIENT
Start: 2025-04-25

## 2025-04-25 RX ORDER — METHYLPREDNISOLONE ACETATE 80 MG/ML
80 INJECTION, SUSPENSION INTRA-ARTICULAR; INTRALESIONAL; INTRAMUSCULAR; SOFT TISSUE ONCE
Status: COMPLETED | OUTPATIENT
Start: 2025-04-25 | End: 2025-04-25

## 2025-04-25 RX ORDER — BUDESONIDE AND FORMOTEROL FUMARATE DIHYDRATE 160; 4.5 UG/1; UG/1
2 AEROSOL RESPIRATORY (INHALATION) 2 TIMES DAILY
Qty: 10.2 G | Refills: 3 | Status: SHIPPED | OUTPATIENT
Start: 2025-04-25

## 2025-04-25 RX ORDER — PREDNISONE 10 MG/1
TABLET ORAL
Qty: 31 TABLET | Refills: 0 | Status: SHIPPED | OUTPATIENT
Start: 2025-04-25

## 2025-04-25 RX ORDER — ALBUTEROL SULFATE AND BUDESONIDE 90; 80 UG/1; UG/1
2 AEROSOL, METERED RESPIRATORY (INHALATION) AS NEEDED
Qty: 10.7 G | Refills: 3 | Status: SHIPPED | OUTPATIENT
Start: 2025-04-25

## 2025-04-25 RX ADMIN — METHYLPREDNISOLONE ACETATE 80 MG: 80 INJECTION, SUSPENSION INTRA-ARTICULAR; INTRALESIONAL; INTRAMUSCULAR; SOFT TISSUE at 10:53

## 2025-04-25 NOTE — PATIENT INSTRUCTIONS
#1.  Discontinue Spiriva  #2 begin Symbicort 2 puffs twice daily and rinse her mouth out after use  #3 begin Airsupra unless not covered by your insurance then continue as needed albuterol  #4 continue as needed DuoNebs but do not use them at the same time as the rescue inhaler it is one of the other  #5 scripts for Symbicort Airsupra and DuoNebs as well as prednisone have been sent to your pharmacy  #6 present for chest x-ray at \[facility]\""  #7 begin prednisone tomorrow as you were given a steroid shot today  #8 keep follow-up in July or call if needed to be sooner

## 2025-04-25 NOTE — PROCEDURES
Walking Oximetry    Performed by: Mey Feng MA  Authorized by: Priyanka Galindo APRN    Supplemental Oxygen: None  Rest room air SAT %:  96  Exercise room air SAT %:  94

## 2025-04-28 ENCOUNTER — TELEPHONE (OUTPATIENT)
Dept: PULMONOLOGY | Facility: CLINIC | Age: 70
End: 2025-04-28
Payer: MEDICARE

## 2025-05-14 ENCOUNTER — TRANSCRIBE ORDERS (OUTPATIENT)
Dept: ADMINISTRATIVE | Facility: HOSPITAL | Age: 70
End: 2025-05-14
Payer: MEDICARE

## 2025-05-14 ENCOUNTER — LAB (OUTPATIENT)
Dept: LAB | Facility: HOSPITAL | Age: 70
End: 2025-05-14
Payer: MEDICARE

## 2025-05-14 DIAGNOSIS — I25.119 ATHEROSCLEROSIS OF NATIVE CORONARY ARTERY WITH ANGINA PECTORIS, UNSPECIFIED WHETHER NATIVE OR TRANSPLANTED HEART: ICD-10-CM

## 2025-05-14 DIAGNOSIS — E66.01 MORBID (SEVERE) OBESITY DUE TO EXCESS CALORIES: ICD-10-CM

## 2025-05-14 DIAGNOSIS — R73.03 PREDIABETES: ICD-10-CM

## 2025-05-14 DIAGNOSIS — E29.1 TESTICULAR HYPOFUNCTION: ICD-10-CM

## 2025-05-14 DIAGNOSIS — Z12.5 ENCOUNTER FOR SCREENING FOR MALIGNANT NEOPLASM OF PROSTATE: ICD-10-CM

## 2025-05-14 DIAGNOSIS — R53.83 OTHER FATIGUE: ICD-10-CM

## 2025-05-14 DIAGNOSIS — Z12.5 ENCOUNTER FOR SCREENING FOR MALIGNANT NEOPLASM OF PROSTATE: Primary | ICD-10-CM

## 2025-05-14 LAB
ALBUMIN SERPL-MCNC: 4.3 G/DL (ref 3.5–5)
ALBUMIN/GLOB SERPL: 1.4 G/DL (ref 1.1–2.5)
ALP SERPL-CCNC: 51 U/L (ref 24–120)
ALT SERPL W P-5'-P-CCNC: 32 U/L (ref 0–50)
ANION GAP SERPL CALCULATED.3IONS-SCNC: 6 MMOL/L (ref 4–13)
AST SERPL-CCNC: 28 U/L (ref 7–45)
AUTO MIXED CELLS #: 0.7 10*3/MM3 (ref 0.1–2.6)
AUTO MIXED CELLS %: 9.1 % (ref 0.1–24)
BILIRUB SERPL-MCNC: 1.3 MG/DL (ref 0.1–1)
BUN SERPL-MCNC: 14 MG/DL (ref 5–21)
BUN/CREAT SERPL: 14
CALCIUM SPEC-SCNC: 9.6 MG/DL (ref 8.6–10.5)
CHLORIDE SERPL-SCNC: 104 MMOL/L (ref 98–110)
CHOLEST SERPL-MCNC: 130 MG/DL (ref 130–200)
CO2 SERPL-SCNC: 26 MMOL/L (ref 24–31)
CREAT SERPL-MCNC: 1 MG/DL (ref 0.5–1.4)
EGFRCR SERPLBLD CKD-EPI 2021: 81.5 ML/MIN/1.73
ERYTHROCYTE [DISTWIDTH] IN BLOOD BY AUTOMATED COUNT: 13.6 % (ref 12.3–15.4)
FOLATE SERPL-MCNC: 12.2 NG/ML (ref 4.78–24.2)
FSH SERPL-ACNC: 5.71 MIU/ML
GLOBULIN UR ELPH-MCNC: 3 GM/DL
GLUCOSE SERPL-MCNC: 127 MG/DL (ref 65–99)
HBA1C MFR BLD: 6.4 % (ref 4.8–5.9)
HCT VFR BLD AUTO: 49.1 % (ref 37.5–51)
HDLC SERPL-MCNC: 51 MG/DL
HGB BLD-MCNC: 16.2 G/DL (ref 13–17.7)
LDLC SERPL CALC-MCNC: 60 MG/DL (ref 0–99)
LDLC/HDLC SERPL: 1.14 {RATIO}
LH SERPL-ACNC: 4.05 MIU/ML
LYMPHOCYTES # BLD AUTO: 1.5 10*3/MM3 (ref 0.7–3.1)
LYMPHOCYTES NFR BLD AUTO: 20.6 % (ref 19.6–45.3)
MCH RBC QN AUTO: 30.4 PG (ref 26.6–33)
MCHC RBC AUTO-ENTMCNC: 33 G/DL (ref 31.5–35.7)
MCV RBC AUTO: 92.1 FL (ref 79–97)
NEUTROPHILS NFR BLD AUTO: 5.3 10*3/MM3 (ref 1.7–7)
NEUTROPHILS NFR BLD AUTO: 70.3 % (ref 42.7–76)
PLATELET # BLD AUTO: 153 10*3/MM3 (ref 140–450)
PMV BLD AUTO: 10.1 FL (ref 6–12)
POTASSIUM SERPL-SCNC: 4.2 MMOL/L (ref 3.5–5.3)
PROT SERPL-MCNC: 7.3 G/DL (ref 6.3–8.7)
RBC # BLD AUTO: 5.33 10*6/MM3 (ref 4.14–5.8)
SODIUM SERPL-SCNC: 136 MMOL/L (ref 135–145)
TRIGL SERPL-MCNC: 105 MG/DL (ref 0–149)
TSH SERPL DL<=0.05 MIU/L-ACNC: 1.42 UIU/ML (ref 0.27–4.2)
VIT B12 BLD-MCNC: 311 PG/ML (ref 211–946)
VLDLC SERPL-MCNC: 19 MG/DL (ref 5–40)
WBC NRBC COR # BLD AUTO: 7.5 10*3/MM3 (ref 3.4–10.8)

## 2025-05-14 PROCEDURE — 83036 HEMOGLOBIN GLYCOSYLATED A1C: CPT

## 2025-05-14 PROCEDURE — 82607 VITAMIN B-12: CPT

## 2025-05-14 PROCEDURE — 84443 ASSAY THYROID STIM HORMONE: CPT

## 2025-05-14 PROCEDURE — 36415 COLL VENOUS BLD VENIPUNCTURE: CPT

## 2025-05-14 PROCEDURE — 84154 ASSAY OF PSA FREE: CPT

## 2025-05-14 PROCEDURE — 84403 ASSAY OF TOTAL TESTOSTERONE: CPT

## 2025-05-14 PROCEDURE — 83002 ASSAY OF GONADOTROPIN (LH): CPT

## 2025-05-14 PROCEDURE — 84153 ASSAY OF PSA TOTAL: CPT

## 2025-05-14 PROCEDURE — 80053 COMPREHEN METABOLIC PANEL: CPT

## 2025-05-14 PROCEDURE — 82746 ASSAY OF FOLIC ACID SERUM: CPT

## 2025-05-14 PROCEDURE — 83001 ASSAY OF GONADOTROPIN (FSH): CPT

## 2025-05-14 PROCEDURE — 85025 COMPLETE CBC W/AUTO DIFF WBC: CPT

## 2025-05-14 PROCEDURE — 84402 ASSAY OF FREE TESTOSTERONE: CPT

## 2025-05-14 PROCEDURE — 80061 LIPID PANEL: CPT

## 2025-05-15 LAB
PSA FREE MFR SERPL: 21.1 %
PSA FREE SERPL-MCNC: 0.4 NG/ML
PSA SERPL-MCNC: 1.9 NG/ML (ref 0–4)

## 2025-05-17 LAB
TESTOST FREE SERPL-MCNC: 2.8 PG/ML (ref 6.6–18.1)
TESTOST SERPL-MCNC: 183 NG/DL (ref 264–916)

## 2025-06-13 RX ORDER — TIOTROPIUM BROMIDE INHALATION SPRAY 1.56 UG/1
2 SPRAY, METERED RESPIRATORY (INHALATION)
COMMUNITY
Start: 2025-05-14

## 2025-06-13 RX ORDER — SEMAGLUTIDE 0.68 MG/ML
0.25 INJECTION, SOLUTION SUBCUTANEOUS WEEKLY
COMMUNITY
Start: 2025-05-15

## 2025-06-16 ENCOUNTER — OFFICE VISIT (OUTPATIENT)
Dept: CARDIOLOGY | Facility: CLINIC | Age: 70
End: 2025-06-16
Payer: MEDICARE

## 2025-06-16 VITALS
BODY MASS INDEX: 38.6 KG/M2 | OXYGEN SATURATION: 97 % | HEART RATE: 78 BPM | WEIGHT: 285 LBS | HEIGHT: 72 IN | DIASTOLIC BLOOD PRESSURE: 80 MMHG | SYSTOLIC BLOOD PRESSURE: 128 MMHG

## 2025-06-16 DIAGNOSIS — E78.5 DYSLIPIDEMIA: ICD-10-CM

## 2025-06-16 DIAGNOSIS — I25.10 CORONARY ARTERY DISEASE INVOLVING NATIVE CORONARY ARTERY OF NATIVE HEART WITHOUT ANGINA PECTORIS: Primary | ICD-10-CM

## 2025-06-16 DIAGNOSIS — R00.2 PALPITATIONS: ICD-10-CM

## 2025-06-16 PROCEDURE — 99214 OFFICE O/P EST MOD 30 MIN: CPT | Performed by: HOSPITALIST

## 2025-06-16 PROCEDURE — G2211 COMPLEX E/M VISIT ADD ON: HCPCS | Performed by: HOSPITALIST

## 2025-06-16 RX ORDER — TESTOSTERONE 20.25 MG/1.25G
GEL TOPICAL
COMMUNITY
Start: 2025-05-16

## 2025-06-16 RX ORDER — ATENOLOL 25 MG/1
12.5 TABLET ORAL DAILY
Qty: 15 TABLET | Refills: 11 | Status: SHIPPED | OUTPATIENT
Start: 2025-06-16

## 2025-06-16 NOTE — PROGRESS NOTES
Reason For Visit:  CAD, dyslipidemia    Subjective        Jerome Parnell is a 69 y.o. male with the below pertinent PMH who presents for follow-up of the above issues.    Jerome Parnell was most recently seen by me in clinic 12/16/2024 at which time he reported that his shortness of breath was getting a little better since starting inhalers per pulmonology.  Metoprolol was stopped.    Today, the patient reports that for the most part he has done okay since his last visit although he thinks he may have felt a little better when he was still on metoprolol.  He now feels like his HR becomes notably more elevated with mild activity, which is associated with some shortness of breath.  He has noticed with things like going bowling his HR will suddenly jump from the 70s to the 130s.  He denies chest pain, lightheadedness, and peripheral edema.  He has had 2 episodes of worsening shortness of breath since his last visit that improved with steroids per pulmonology.  He now does feel like his breathing overall is more stable.    ROS: Pertinent findings are included above.    Cardiac Studies  Echo 6/18/2024: LVEF 60 to 65%, normal LV size/thickness, normal diastolic function, normal RV size/function, normal biatrial size, no hemodynamically significant valve abnormalities.  [facility]iscan myocardial perfusion SPECT 6/18/2024: Moderate risk study.  Mild severity stress perfusion defect that is small in size in the inferoseptal and inferior segments and partially reversible.  [facility] 8/28/2024: Single-vessel CAD with a tubular severe 90% stenosis extending from the mid circumflex into the proximal portion of OM 2.  Unsuccessful PCI.  LVEF 45% with inferolateral hypokinesis and normal EDP.  [facility] 9/11/2024: Successful PCI to the LCx extending into OM 2.  Echo 12/10/2024: Technically difficult study.  LVEF 56 to 60%.  LV filling pattern suggestive of impaired relaxation.     Pertinent PMH  CAD s/p LCx PCI  Dyslipidemia  GERD  Obesity  COPD  "and asthma  Nocturnal hypoxia  Former tobacco use  Family history of ASCVD (father had CAD with CABG at age 63)    Pertinent past medical, surgical, family, and social history were reviewed.      Current Outpatient Medications:     albuterol sulfate  (90 Base) MCG/ACT inhaler, Inhale 2 puffs Every 4 (Four) Hours As Needed for Wheezing., Disp: 18 g, Rfl: 3    aspirin 81 MG EC tablet, Take 1 tablet by mouth Daily., Disp: 90 tablet, Rfl: 1    clopidogrel (PLAVIX) 75 MG tablet, Take 1 tablet by mouth Daily., Disp: 30 tablet, Rfl: 11    ipratropium-albuterol (DUO-NEB) 0.5-2.5 mg/3 ml nebulizer, Take 3 mL by nebulization 4 (Four) Times a Day., Disp: 360 mL, Rfl: 0    nitroglycerin (NITROSTAT) 0.4 MG SL tablet, Place 1 tablet under the tongue Every 5 (Five) Minutes As Needed for Chest Pain (Only if SBP Greater Than 100). Take no more than 3 doses in 15 minutes., Disp: 30 tablet, Rfl: 1    O2 (OXYGEN), Inhale 2 L/min Every Night. ALLI Fraga, Disp: , Rfl:     omeprazole (priLOSEC) 20 MG capsule, Take 1 capsule by mouth Daily. OTC, Disp: , Rfl:     Ozempic, 0.25 or 0.5 MG/DOSE, 2 MG/3ML solution pen-injector, Inject 0.25 mg under the skin into the appropriate area as directed 1 (One) Time Per Week., Disp: , Rfl:     rosuvastatin (CRESTOR) 20 MG tablet, Take 1 tablet by mouth Every Night., Disp: 90 tablet, Rfl: 3    Tiotropium Bromide Monohydrate (Spiriva Respimat) 1.25 MCG/ACT aerosol solution inhaler, Inhale 2 puffs Daily., Disp: , Rfl:      Objective   Vital Signs:  /80   Pulse 78   Ht 182.9 cm (72\")   Wt 129 kg (285 lb)   SpO2 97%   BMI 38.65 kg/m²   Estimated body mass index is 38.65 kg/m² as calculated from the following:    Height as of this encounter: 182.9 cm (72\").    Weight as of this encounter: 129 kg (285 lb).      Constitutional:       Appearance: Healthy appearance. Not in distress.   Neck:      Vascular: JVD normal.   Pulmonary:      Effort: Pulmonary effort is normal.      Breath sounds: " Normal breath sounds.   Cardiovascular:      Normal rate. Regular rhythm.      Murmurs: There is no murmur.      No gallop.  No click. No rub.   Edema:     Peripheral edema absent.   Abdominal:      General: There is no distension.      Palpations: Abdomen is soft.      Tenderness: There is no abdominal tenderness.   Skin:     General: Skin is warm and dry.   Neurological:      Mental Status: Alert and oriented to person, place and time.        Result Review :  The following data was reviewed by: Jesus Armstrong MD on 06/16/2025:  CMP   CMP          9/12/2024    03:57 10/22/2024    12:18 5/14/2025    10:06   CMP   Glucose 102  105  127    BUN 13  12  14    Creatinine 0.99  0.93  1.00    EGFR 82.5  88.9  81.5    Sodium 137  140  136    Potassium 3.8  4.2  4.2    Chloride 101  102  104    Calcium 9.1  9.5  9.6    Total Protein  6.8  7.3    Albumin  4.3  4.3    Globulin  2.5  3.0    Total Bilirubin  0.6  1.3    Alkaline Phosphatase  58  51    AST (SGOT)  19  28    ALT (SGPT)  20  32    Albumin/Globulin Ratio  1.7  1.4    BUN/Creatinine Ratio 13.1  12.9  14.0    Anion Gap 9.0  9.0  6.0      CBC   CBC          9/12/2024    03:57 10/22/2024    12:18 5/14/2025    10:06   CBC   WBC 6.20  5.38  7.50    RBC 4.58  5.01  5.33    Hemoglobin 14.0  15.1  16.2    Hematocrit 40.8  45.4  49.1    MCV 89.1  90.6  92.1    MCH 30.6  30.1  30.4    MCHC 34.3  33.3  33.0    RDW 13.2  13.2  13.6    Platelets 189  200  153      Lipid Panel   Lipid Panel          7/11/2024    04:31 5/14/2025    10:06   Lipid Panel   Total Cholesterol 140  130    Triglycerides 93  105    HDL Cholesterol 36  51    VLDL Cholesterol 18  19    LDL Cholesterol  86  60    LDL/HDL Ratio 2.37  1.14             ECG 12 Lead    Date/Time: 6/16/2025 2:03 PM  Performed by: Jesus Armstrong MD    Authorized by: Jesus Armstrong MD  Comparison: compared with previous ECG from 12/16/2024  Similar to previous ECG  Rhythm: sinus rhythm  Rate: normal  BPM: 78  Conduction: conduction  normal  QRS axis: extreme  Other findings comments: RVH, pulmonary disease pattern    Clinical impression: abnormal EKG            Assessment and Plan   Diagnoses and all orders for this visit:    1. Coronary artery disease involving native coronary artery of native heart without angina pectoris (Primary)    2. Dyslipidemia    3. Palpitations  -     Holter Monitor - 72 Hour Up To 15 Days; Future    Other orders  -     atenolol (TENORMIN) 25 MG tablet; Take 0.5 tablets by mouth Daily.  Dispense: 15 tablet; Refill: 11  -     ECG 12 Lead      -Plan to obtain a cardiac monitor to assess his palpitations and elevated HR.  And starting atenolol 12.5 mg daily to try to help with palpitations but hopefully avoid exacerbating pulmonary issues.  - Continue Plavix 75 mg daily until September and then he can discontinue this  - Continue aspirin 81 mg daily  - Continue Crestor 20 mg daily; most recent lipid show overall improved control, and we agreed to hold off on any additional medication changes for now.      Follow Up   Return in about 6 months (around 12/16/2025).  Patient was given instructions and counseling regarding his condition or for health maintenance advice. Please see specific information pulled into the AVS if appropriate.       Part of this note may be an electronic transcription/translation of spoken language to printed text using the Dragon Dictation System.    Same Histology In Subsequent Stages Text: The pattern and morphology of the tumor is as described in the first stage.

## 2025-07-04 ENCOUNTER — RESULTS FOLLOW-UP (OUTPATIENT)
Dept: CARDIOLOGY | Facility: CLINIC | Age: 70
End: 2025-07-04
Payer: MEDICARE

## 2025-07-07 NOTE — TELEPHONE ENCOUNTER
The patient is called and notified of his holter result.  He does think the Atenolol is helping but he feels like he might benefit more with a dose increase as suggested.  He does not currently check his BP readings.  Please advise.  Maddi Bruner MA

## 2025-07-08 NOTE — TELEPHONE ENCOUNTER
The patient is called and notified that Dr. Armstrong agrees to increasing his Atenolol to 25mg daily and he is cautioned to look for any lightheaded issues and to check his bp to make sure it does not drop too low.  He voices understanding.  Maddi Bruner MA

## 2025-07-08 NOTE — PROGRESS NOTES
" ANDRES Ham  [facility]   Pulmonary and Critical Care  [address]  [place] [address]  Phone: [phone]  Fax: [phone]           Chief Complaint  Wheezing, Asthma, and COPD    Subjective        Jerome Parnell presents to [facility] PULMONARY & CRITICAL CARE MEDICINE     History of Present Illness  Mr. Parnell is a 69-year-old male patient with known asthma COPD overlap, seasonal allergies, CAD, GERD, hyperlipidemia, hypertension, arthritis.     He continues to use oxygen at night, although it occasionally dislodges during sleep. He experienced mild shortness of breath this morning, which he attributes to adjusting to the [place]. He has not used DuoNebs since April 2025. He reports that his shortness of breath has improved since his last visit in April 2025, and he recovers quickly when it occurs. He was unable to tolerate Symbicort and returned to using Spiriva, which he finds effective. He has experienced voice loss due to steroid inhalers affecting his vocal cords, even after trying several different types.    He saw Dr. Armstrong in June 2025 for a fast heart rate and palpitations. A Holter monitor was performed, and he was started on atenolol 12.5 mg daily, which was later increased to 25 mg daily. He started the higher dose yesterday and reports that it has helped. He believes some of his shortness of breath was secondary to the fast heart rate and palpitations. He mentions that his blood pressure was normal, but any heart medication tends to lower it. He recalls an incident a couple of weeks ago while wearing the monitor when his heart rate spiked to 130-140, causing him distress and difficulty breathing.    He does note the oral steroids at the last visit helped him considerably as well.       Objective   Vital Signs:   /78   Pulse 67   Ht 182.9 cm (72.01\")   Wt 131 kg (288 lb)   SpO2 96% Comment: RA  BMI 39.05 kg/m²     Physical " Exam  Vitals reviewed.   Constitutional:       Appearance: Normal appearance. He is obese.   Cardiovascular:      Rate and Rhythm: Normal rate and regular rhythm.   Pulmonary:      Effort: Pulmonary effort is normal.      Breath sounds: Normal breath sounds.   Neurological:      General: No focal deficit present.      Mental Status: He is alert and oriented to person, place, and time.   Psychiatric:         Mood and Affect: Mood normal.         Behavior: Behavior normal.            Result Review :  The following data was reviewed by: ANDRES Ham on 07/09/2025:    Data reviewed : Radiologic studies CXR   My interpretation of imaging:  no acute findings, stable bronchial wall thickening and hyperexpansion  My interpretation of labs: none  Holter Monitor - 72 Hour Up To 15 Days (06/16/2025 14:53)   predominantly sinus-sinus tach, no sustained arrythmias   My interpretation of the PFT : no new     Results for orders placed during the hospital encounter of 10/30/24    Complete PFT - Pre & Post Bronchodilator    Narrative  [facility] - Pulmonary Function Test    [address] [place] [address]  [address]  [phone]    Patient : Jerome Parnell  MRN : [id number]  CSN : [id number]  Pulmonologist : Kin Zimmer MD  Date : 10/30/2024    ______________________________________________________________________    Interpretation :  1.  Spirometry is consistent with a severe obstructive ventilatory defect.  2.  There is some improvement in spirometry postbronchodilator but a moderate bordering on severe obstructive ventilatory defect is still present.  3.  Lung volumes reveal a decrease in vital capacity secondary to obstruction.  Hyperinflation is also present.  4.  There is a moderate diffusion impairment which when corrected for alveolar volume is a mild diffusion impairment.      Kin Zimmer MD    Rest/Exercise Pulse Ox Values          11/7/2024    09:00 4/25/2025    10:00   Rest/Exercise  Pulse Ox Results   Rest room air SAT % 96 96   Exercise room air SAT % 96 94         Assessment and Plan   Diagnoses and all orders for this visit:    1. Asthma-COPD overlap syndrome (Primary)  Overview:  10/30/2024 severe obstruction, hyperinflation and an 18% improvement in FEV1 postbronchodilator    Assessment & Plan:  Continue Spiriva and as needed albuterol or DuoNebs.  He has not needed the rescue medication since his last visit him here in April at which time he got steroids.  He is also just started atenolol per cardiology for tachycardia and finds benefit in that as well.  He will keep a watch on his blood pressure and notify Dr. Armstrong of any issues.  Should we need to adjust his Spiriva in the future we will avoid ICS secondary to intolerance and look towards LABA LAMA medications.            2. GERD without esophagitis  Assessment & Plan:  Continue omeprazole      3. Nocturnal hypoxia  Overview:  Overnight Sleep Oximetry Study (11/13/2024 00:00)   IGM-Ysylej-5 L  Walking oximetry November 2024 did not qualify him for exertional daytime supplemental oxygen.  2/2025 insurance denied sleep study with [place] of 3    Assessment & Plan:  Continued use supplemental oxygen as tolerated nocturnally.        Jerome FERRELL Parmjit  reports that he has quit smoking. His smoking use included cigarettes. He started smoking about 25 years ago. He has a 25.5 pack-year smoking history. He has been exposed to tobacco smoke. He has never used smokeless tobacco.         Alpha 1: Normal, MM  LDCT: Does not qualify, timed out  Smoking Cessation: Former, quit 2000  Vaccinations: Reminded to discuss with his PCP as he usually obtains vaccinations at his PCP.         Follow Up   Return in about 6 months (around 1/9/2026) for FVL w DIF.  Patient was given instructions and counseling regarding his condition or for health maintenance advice. Please see specific information pulled into the AVS if appropriate.     Priyanka Galindo,  ANDRES  7/9/2025  09:38 CDT    Please note that portions of this note were completed with a voice recognition program.    Patient or patient representative verbalized consent for the use of Ambient Listening during the visit with  ANDRES Ham for chart documentation. 7/9/2025  09:37 CDT

## 2025-07-09 ENCOUNTER — OFFICE VISIT (OUTPATIENT)
Dept: PULMONOLOGY | Facility: CLINIC | Age: 70
End: 2025-07-09
Payer: MEDICARE

## 2025-07-09 VITALS
HEIGHT: 72 IN | SYSTOLIC BLOOD PRESSURE: 124 MMHG | DIASTOLIC BLOOD PRESSURE: 78 MMHG | HEART RATE: 67 BPM | BODY MASS INDEX: 39.01 KG/M2 | WEIGHT: 288 LBS | OXYGEN SATURATION: 96 %

## 2025-07-09 DIAGNOSIS — J44.89 ASTHMA-COPD OVERLAP SYNDROME: Primary | Chronic | ICD-10-CM

## 2025-07-09 DIAGNOSIS — G47.34 NOCTURNAL HYPOXIA: Chronic | ICD-10-CM

## 2025-07-09 DIAGNOSIS — K21.9 GERD WITHOUT ESOPHAGITIS: Chronic | ICD-10-CM

## 2025-07-09 PROCEDURE — 1160F RVW MEDS BY RX/DR IN RCRD: CPT | Performed by: NURSE PRACTITIONER

## 2025-07-09 PROCEDURE — 1159F MED LIST DOCD IN RCRD: CPT | Performed by: NURSE PRACTITIONER

## 2025-07-09 PROCEDURE — 99214 OFFICE O/P EST MOD 30 MIN: CPT | Performed by: NURSE PRACTITIONER

## 2025-07-09 RX ORDER — MULTIPLE VITAMINS W/ MINERALS TAB 9MG-400MCG
1 TAB ORAL DAILY
COMMUNITY

## 2025-07-09 NOTE — ASSESSMENT & PLAN NOTE
Continue Spiriva and as needed albuterol or DuoNebs.  He has not needed the rescue medication since his last visit him here in April at which time he got steroids.  He is also just started atenolol per cardiology for tachycardia and finds benefit in that as well.  He will keep a watch on his blood pressure and notify Dr. Armstrong of any issues.  Should we need to adjust his Spiriva in the future we will avoid ICS secondary to intolerance and look towards LABA LAMA medications.

## 2025-08-21 RX ORDER — CLOPIDOGREL BISULFATE 75 MG/1
75 TABLET ORAL DAILY
Qty: 30 TABLET | Refills: 1 | Status: SHIPPED | OUTPATIENT
Start: 2025-08-21

## (undated) DEVICE — 6F .070 XB 3.5 100CM: Brand: VISTA BRITE TIP

## (undated) DEVICE — HI-TORQUE WHISPER ES GUIDE WIRE .014 STRAIGHTTIP 3.0 CM X 190 CM: Brand: HI-TORQUE WHISPER

## (undated) DEVICE — PAD, DEFIB, ADULT, RADIOTRANS, PHYSIO: Brand: MEDLINE

## (undated) DEVICE — Device

## (undated) DEVICE — FORCEPS BX L240CM DIA2.4MM L NDL RAD JAW 4 133340

## (undated) DEVICE — PINNACLE INTRODUCER SHEATH: Brand: PINNACLE

## (undated) DEVICE — INSTINCT ENDOSCOPIC HEMOCLIP: Brand: INSTINCT

## (undated) DEVICE — PK CATH CARD 30 CA/4

## (undated) DEVICE — DEV TORQ GW HOT/PINK

## (undated) DEVICE — BALN NC/EUPHORA RX 2.00X12MM

## (undated) DEVICE — Device: Brand: MEDEX

## (undated) DEVICE — LIMB HOLDER, WRIST/ANKLE: Brand: DEROYAL

## (undated) DEVICE — SOL IRR NACL 0.9PCT BT 1000ML

## (undated) DEVICE — GUIDELINER CATHETERS ARE INTENDED TO BE USED IN CONJUNCTION WITH GUIDE CATHETERS TO ACCESS DISCRETE REGIONS OF THE CORONARY AND/OR PERIPHERAL VASCULATURE, AND TO FACILITATE PLACEMENT OF INTERVENTIONAL DEVICES.: Brand: GUIDELINER® V3 CATHETER

## (undated) DEVICE — TOOL INSRT GW MTL OR PLSTC

## (undated) DEVICE — SOLIDIFIER LIQ LIQUILOC/PLUS W/TREAT 2000CC

## (undated) DEVICE — CANN NASL ETCO2 LO/FLO A/

## (undated) DEVICE — TR BAND RADIAL ARTERY COMPRESSION DEVICE: Brand: TR BAND

## (undated) DEVICE — GLIDESHEATH SLENDER STAINLESS STEEL KIT: Brand: GLIDESHEATH SLENDER

## (undated) DEVICE — KT VLV HEMO MAP ACC PLS LG/BORE MTL/INTRO

## (undated) DEVICE — GW STARTER FXD CORE J .035 3X150CM 3MM

## (undated) DEVICE — RADIFOCUS OPTITORQUE ANGIOGRAPHIC CATHETER: Brand: OPTITORQUE

## (undated) DEVICE — CATH F6INF PIG 145 110CM 6SH: Brand: INFINITI

## (undated) DEVICE — GC 7F 078 XB 3.5: Brand: VISTA BRITE TIP

## (undated) DEVICE — INFLATION DEVICE: Brand: ENCORE™ 26

## (undated) DEVICE — PERCLOSE™ PROSTYLE™ SUTURE-MEDIATED CLOSURE AND REPAIR SYSTEM: Brand: PERCLOSE™ PROSTYLE™

## (undated) DEVICE — NC TREK NEO™ CORONARY DILATATION CATHETER 2.25 MM X 12 MM / RAPID-EXCHANGE: Brand: NC TREK NEO™

## (undated) DEVICE — DRSNG SURESITE WNDW 4X4.5

## (undated) DEVICE — ENDO KIT,LOURDES HOSPITAL: Brand: MEDLINE INDUSTRIES, INC.

## (undated) DEVICE — Device: Brand: PROWATER

## (undated) DEVICE — MINI TREK CORONARY DILATATION CATHETER 2.0 MM X 15 MM / RAPID-EXCHANGE: Brand: MINI TREK

## (undated) DEVICE — NC TREK NEO™ CORONARY DILATATION CATHETER 2.50 X 8 MM / RAPID-EXCHANGE: Brand: NC TREK NEO™

## (undated) DEVICE — HI-TORQUE POWERTURN FLEX GUIDE WIRE W/HYDROPHILIC COATING .014" STRAIGHT TIP 190 CM: Brand: HI-TORQUE POWERTURN

## (undated) DEVICE — SNARE POLYP SM W13MMXL240CM SHTH DIA2.4MM OVL FLX DISP

## (undated) DEVICE — PTCA DILATATION CATHETER: Brand: NC EMERGE®

## (undated) DEVICE — SUP ARMBRD ART/LINE BLU

## (undated) DEVICE — CATH INTRAVASC/LITHO C2PLUS 2.5X12MM

## (undated) DEVICE — DRAPE,ANGIO,BRACH,STERILE,38X44: Brand: MEDLINE

## (undated) DEVICE — GC 7F 078 AL 1: Brand: VISTA BRITE TIP